# Patient Record
Sex: FEMALE | URBAN - METROPOLITAN AREA
[De-identification: names, ages, dates, MRNs, and addresses within clinical notes are randomized per-mention and may not be internally consistent; named-entity substitution may affect disease eponyms.]

---

## 2024-08-12 ENCOUNTER — HOSPITAL ENCOUNTER (EMERGENCY)
Facility: HOSPITAL | Age: 24
Discharge: HOME | End: 2024-08-12
Attending: EMERGENCY MEDICINE

## 2024-08-12 VITALS
TEMPERATURE: 99 F | OXYGEN SATURATION: 100 % | WEIGHT: 123 LBS | HEART RATE: 64 BPM | DIASTOLIC BLOOD PRESSURE: 60 MMHG | SYSTOLIC BLOOD PRESSURE: 120 MMHG | BODY MASS INDEX: 24.15 KG/M2 | HEIGHT: 60 IN | RESPIRATION RATE: 16 BRPM

## 2024-08-12 DIAGNOSIS — O46.90 VAGINAL BLEEDING IN PREGNANCY: Primary | ICD-10-CM

## 2024-08-12 LAB
ABO + RH BLD: NORMAL
ALBUMIN SERPL BCP-MCNC: 4 G/DL (ref 3.5–5.2)
ALP SERPL-CCNC: 50 U/L (ref 55–135)
ALT SERPL W/O P-5'-P-CCNC: 19 U/L (ref 10–44)
ANION GAP SERPL CALC-SCNC: 9 MMOL/L (ref 8–16)
AST SERPL-CCNC: 19 U/L (ref 10–40)
B-HCG UR QL: POSITIVE
BASOPHILS # BLD AUTO: 0.06 K/UL (ref 0–0.2)
BASOPHILS NFR BLD: 0.6 % (ref 0–1.9)
BILIRUB SERPL-MCNC: 0.7 MG/DL (ref 0.1–1)
BILIRUB UR QL STRIP: NEGATIVE
BUN SERPL-MCNC: 6 MG/DL (ref 6–20)
CALCIUM SERPL-MCNC: 9.2 MG/DL (ref 8.7–10.5)
CHLORIDE SERPL-SCNC: 106 MMOL/L (ref 95–110)
CLARITY UR: CLEAR
CO2 SERPL-SCNC: 22 MMOL/L (ref 23–29)
COLOR UR: COLORLESS
CREAT SERPL-MCNC: 0.6 MG/DL (ref 0.5–1.4)
CTP QC/QA: YES
DIFFERENTIAL METHOD BLD: ABNORMAL
EOSINOPHIL # BLD AUTO: 1.2 K/UL (ref 0–0.5)
EOSINOPHIL NFR BLD: 11.4 % (ref 0–8)
ERYTHROCYTE [DISTWIDTH] IN BLOOD BY AUTOMATED COUNT: 12.6 % (ref 11.5–14.5)
EST. GFR  (NO RACE VARIABLE): >60 ML/MIN/1.73 M^2
GLUCOSE SERPL-MCNC: 79 MG/DL (ref 70–110)
GLUCOSE UR QL STRIP: NEGATIVE
HCG INTACT+B SERPL-ACNC: NORMAL MIU/ML
HCT VFR BLD AUTO: 35.1 % (ref 37–48.5)
HGB BLD-MCNC: 11.8 G/DL (ref 12–16)
HGB UR QL STRIP: ABNORMAL
IMM GRANULOCYTES # BLD AUTO: 0.06 K/UL (ref 0–0.04)
IMM GRANULOCYTES NFR BLD AUTO: 0.6 % (ref 0–0.5)
KETONES UR QL STRIP: NEGATIVE
LEUKOCYTE ESTERASE UR QL STRIP: ABNORMAL
LYMPHOCYTES # BLD AUTO: 2.1 K/UL (ref 1–4.8)
LYMPHOCYTES NFR BLD: 19.9 % (ref 18–48)
MCH RBC QN AUTO: 28.8 PG (ref 27–31)
MCHC RBC AUTO-ENTMCNC: 33.6 G/DL (ref 32–36)
MCV RBC AUTO: 86 FL (ref 82–98)
MICROSCOPIC COMMENT: NORMAL
MONOCYTES # BLD AUTO: 0.7 K/UL (ref 0.3–1)
MONOCYTES NFR BLD: 6.5 % (ref 4–15)
NEUTROPHILS # BLD AUTO: 6.3 K/UL (ref 1.8–7.7)
NEUTROPHILS NFR BLD: 61 % (ref 38–73)
NITRITE UR QL STRIP: NEGATIVE
NRBC BLD-RTO: 0 /100 WBC
PH UR STRIP: 7 [PH] (ref 5–8)
PLATELET # BLD AUTO: 199 K/UL (ref 150–450)
PMV BLD AUTO: 10.2 FL (ref 9.2–12.9)
POTASSIUM SERPL-SCNC: 3.2 MMOL/L (ref 3.5–5.1)
PROT SERPL-MCNC: 7.5 G/DL (ref 6–8.4)
PROT UR QL STRIP: NEGATIVE
RBC # BLD AUTO: 4.1 M/UL (ref 4–5.4)
RBC #/AREA URNS HPF: 0 /HPF (ref 0–4)
SODIUM SERPL-SCNC: 137 MMOL/L (ref 136–145)
SP GR UR STRIP: 1.01 (ref 1–1.03)
SQUAMOUS #/AREA URNS HPF: 4 /HPF
URN SPEC COLLECT METH UR: ABNORMAL
UROBILINOGEN UR STRIP-ACNC: NEGATIVE EU/DL
WBC # BLD AUTO: 10.32 K/UL (ref 3.9–12.7)
WBC #/AREA URNS HPF: 0 /HPF (ref 0–5)

## 2024-08-12 PROCEDURE — 86901 BLOOD TYPING SEROLOGIC RH(D): CPT

## 2024-08-12 PROCEDURE — 84702 CHORIONIC GONADOTROPIN TEST: CPT

## 2024-08-12 PROCEDURE — 99284 EMERGENCY DEPT VISIT MOD MDM: CPT | Mod: 25

## 2024-08-12 PROCEDURE — 81025 URINE PREGNANCY TEST: CPT | Performed by: EMERGENCY MEDICINE

## 2024-08-12 PROCEDURE — 25000003 PHARM REV CODE 250

## 2024-08-12 PROCEDURE — 85025 COMPLETE CBC W/AUTO DIFF WBC: CPT

## 2024-08-12 PROCEDURE — 86900 BLOOD TYPING SEROLOGIC ABO: CPT

## 2024-08-12 PROCEDURE — 80053 COMPREHEN METABOLIC PANEL: CPT

## 2024-08-12 PROCEDURE — 81000 URINALYSIS NONAUTO W/SCOPE: CPT

## 2024-08-12 PROCEDURE — 96360 HYDRATION IV INFUSION INIT: CPT

## 2024-08-12 RX ADMIN — SODIUM CHLORIDE 1000 ML: 9 INJECTION, SOLUTION INTRAVENOUS at 11:08

## 2024-08-12 NOTE — Clinical Note
"Radha "Radha" Wiley Nicole was seen and treated in our emergency department on 8/12/2024.  She may return to work on 08/15/2024.       If you have any questions or concerns, please don't hesitate to call.      Jaylon Kinsey MD"

## 2024-08-12 NOTE — DISCHARGE INSTRUCTIONS
No sex.   No strenuous activity or exercise.   Encourage fluids/ water.     You are 10 wks and 6 days.     Estimate date of delivery is March 4th 2025.

## 2024-08-12 NOTE — ED TRIAGE NOTES
Vaginal Bleeding (Pt 3 months pregnant and c/o vaginal bleeding starting yesterday. Reports abd pain )

## 2024-08-12 NOTE — ED PROVIDER NOTES
"Encounter Date: 2024    SCRIBE #1 NOTE: I, Alexa Allen, am scribing for, and in the presence of,  Jaylon Kinsey MD. I have scribed the following portions of the note - Other sections scribed: HPI, ROS,PE,MDM.       History     Chief Complaint   Patient presents with    Vaginal Bleeding     Pt 3 months pregnant and c/o vaginal bleeding starting yesterday. Reports abd pain      Radhabogdan Nicole is a 24 y.o. female, A0, with no prior PMHx, who presents to the ED with complaint of intermittent vaginal "spotting" with associated intermittent abdominal pain and intermittent back pain that began yesterday. Patient rates pain a 4/10 and describes the pain being near her waist. Patient states she took a pregnancy test 3 months ago and it was positive. No other exacerbating or alleviating factors. Patient states last menstrual period was May 1st. Patient notes her 1st OBGYN appointment is 24. Patient is unsure who her physician is. Denies chest pain, dyspnea or other associated symptoms. NKDA. Patient denies EtOH, tobacco, or illicit drug use.   This encounter was done using a telephonic . The patient was given the opportunity to ask questions and everything was answered to her satisfaction. She voiced understanding of diagnosis and plan.  [place] Islander,  ID : [id number]      The history is provided by the patient. The history is limited by a language barrier. A  was used.     Review of patient's allergies indicates:  No Known Allergies  History reviewed. No pertinent past medical history.  History reviewed. No pertinent surgical history.  No family history on file.     Review of Systems   Constitutional:  Negative for fever.   HENT:  Negative for sore throat.    Eyes:  Negative for visual disturbance.   Respiratory:  Negative for shortness of breath.    Cardiovascular:  Negative for chest pain.   Gastrointestinal:  Positive for abdominal pain (intermittent). "   Genitourinary:  Positive for vaginal bleeding (intermittent spotting). Negative for dysuria.   Musculoskeletal:  Positive for back pain (intermittent).   Skin:  Negative for rash.       Physical Exam     Initial Vitals [08/12/24 0937]   BP Pulse Resp Temp SpO2   123/60 66 16 98.7 °F (37.1 °C) 100 %      MAP       --         Physical Exam    Nursing note and vitals reviewed.  Constitutional: She appears well-developed and well-nourished.   HENT:   Head: Normocephalic and atraumatic.   Eyes: EOM are normal. Pupils are equal, round, and reactive to light.   Neck: Neck supple. No thyromegaly present. No JVD present.   Normal range of motion.  Cardiovascular:  Normal rate and regular rhythm.     Exam reveals no gallop and no friction rub.       No murmur heard.  Pulmonary/Chest: Breath sounds normal. No respiratory distress.   Abdominal: Abdomen is soft. Bowel sounds are normal. There is abdominal tenderness (mild) in the suprapubic area. There is no rebound and no guarding.   Musculoskeletal:         General: No tenderness or edema. Normal range of motion.      Cervical back: Normal range of motion and neck supple.     Neurological: She is alert and oriented to person, place, and time. She has normal strength. GCS score is 15. GCS eye subscore is 4. GCS verbal subscore is 5. GCS motor subscore is 6.   Skin: Skin is warm and dry. Capillary refill takes less than 2 seconds.   Psychiatric: She has a normal mood and affect.         ED Course   Procedures  Labs Reviewed   CBC W/ AUTO DIFFERENTIAL - Abnormal       Result Value    WBC 10.32      RBC 4.10      Hemoglobin 11.8 (*)     Hematocrit 35.1 (*)     MCV 86      MCH 28.8      MCHC 33.6      RDW 12.6      Platelets 199      MPV 10.2      Immature Granulocytes 0.6 (*)     Gran # (ANC) 6.3      Immature Grans (Abs) 0.06 (*)     Lymph # 2.1      Mono # 0.7      Eos # 1.2 (*)     Baso # 0.06      nRBC 0      Gran % 61.0      Lymph % 19.9      Mono % 6.5      Eosinophil %  11.4 (*)     Basophil % 0.6      Differential Method Automated      Narrative:     Release to patient->Immediate   COMPREHENSIVE METABOLIC PANEL - Abnormal    Sodium 137      Potassium 3.2 (*)     Chloride 106      CO2 22 (*)     Glucose 79      BUN 6      Creatinine 0.6      Calcium 9.2      Total Protein 7.5      Albumin 4.0      Total Bilirubin 0.7      Alkaline Phosphatase 50 (*)     AST 19      ALT 19      eGFR >60      Anion Gap 9      Narrative:     Release to patient->Immediate   URINALYSIS, REFLEX TO URINE CULTURE - Abnormal    Specimen UA Urine, Clean Catch      Color, UA Colorless (*)     Appearance, UA Clear      pH, UA 7.0      Specific Gravity, UA 1.010      Protein, UA Negative      Glucose, UA Negative      Ketones, UA Negative      Bilirubin (UA) Negative      Occult Blood UA Trace (*)     Nitrite, UA Negative      Urobilinogen, UA Negative      Leukocytes, UA 2+ (*)     Narrative:     Specimen Source->Urine   POCT URINE PREGNANCY - Abnormal    POC Preg Test, Ur Positive (*)      Acceptable Yes     HCG, QUANTITATIVE    HCG Quant [id number]      Narrative:     Release to patient->Immediate   URINALYSIS MICROSCOPIC    RBC, UA 0      WBC, UA 0      Squam Epithel, UA 4      Microscopic Comment SEE COMMENT      Narrative:     Specimen Source->Urine   GROUP & RH    Group & Rh A POS            Imaging Results              US OB <14 Wks, TransAbd, Single Gestation (Final result)  Result time 08/12/24 12:06:26   Procedure changed from US OB <14 Wks TransAbd & TransVag, Single Gestation (XPD)     Final result by Adrian Collins MD (08/12/24 12:06:26)                   Impression:      Single live intrauterine pregnancy with estimated gestational age 10 weeks 6 days. and an CHANNING of 03/04/2025.    Borderline fetal tachycardia at 170 beats per minute.      Electronically signed by: Adrian Collins MD  Date:    08/12/2024  Time:    12:06               Narrative:    EXAMINATION:  US OB <14 WEEKS  "TRANSABDOM, SINGLE GESTATION    CLINICAL HISTORY:  Vag Bleeding;    TECHNIQUE:  Transabdominal only sonography of the pelvis was performed.    COMPARISON:  None.    FINDINGS:  Uterus: 12.6 x 7.6 x 8.1 cm.  No discrete fibroid.    Intrauterine gestation(s): Single    Mean gestational sac diameter: 3.8 cm    Yolk sac: None seen    Crown-rump length (CRL): 4 cm    Average fetal cardiac activity: 170 bpm, upper limits of normal    Subchorionic hemorrhage: None.    Right ovary: 3.6 x 2.6 x 2.6 cm with intact arterial and venous flow containing a 2 cm probable corpus luteum    Left ovary: 2.5 x 1.5 x 2 cm with intact arterial and venous flow    Miscellaneous: No Free Fluid.                                       Medications   sodium chloride 0.9% bolus 1,000 mL 1,000 mL (1,000 mLs Intravenous New Bag 24 1130)     Medical Decision Making  This is an emergent evaluation of a 24 y.o. female who presents A0, with no prior PMHx, who presents to the ED with complaint of intermittent vaginal "spotting" with associated intermittent abdominal pain and intermittent back pain that began yesterday.. The patient was seen and examined. The history and physical exam was obtained. The nursing notes and vital signs were reviewed. Secondary to symptoms and examination findings, I ordered labs. Will treat .       Amount and/or Complexity of Data Reviewed  Labs: ordered. Decision-making details documented in ED Course.     Details: See HPI    Analysis shows no evidence of infection of the 2+ leukocyte esterase but no bacteria no white blood cells.  Will not treat this time.  Due to being pregnant will send culture.  Patient has a viable intrauterine pregnancy at 10 weeks and 6 days.  Estimated due date of 2025.  No subchorionic hemorrhage or other abnormality noted.  Due to reported vaginal spotting recommend pelvic rest, limit strenuous exercise, encourage fluids, patient has follow up with Dr. Valverde on initial visit in 1 " week.        Scribe Attestation:   Scribe #1: I performed the above scribed service and the documentation accurately describes the services I performed. I attest to the accuracy of the note.                             I, Jaylon Kinsey, personally performed the services described in this documentation. All medical record entries made by the scribe were at my direction and in my presence. I have reviewed the chart and agree that the record reflects my personal performance and is accurate and complete.      DISCLAIMER: This note was prepared with IP Ghoster voice recognition transcription software. Garbled syntax, mangled pronouns, and other bizarre constructions may be attributed to that software system.    Clinical Impression:  Final diagnoses:  [O46.90] Vaginal bleeding in pregnancy (Primary)          ED Disposition Condition    Discharge Stable          ED Prescriptions    None       Follow-up Information       Follow up With Specialties Details Why Contact Info    Michael Valverde MD Obstetrics and Gynecology, Obstetrics and Gynecology  follow up with your scheduled appointment next week. [address]  [address]  [facility] [address]  [phone]      [facility] [place] - Emergency Dept Emergency Medicine  As needed, If symptoms worsen or new symptoms develop [address] [facility] [place] [id number] [phone]             Jaylon Kinsey MD  08/12/24 2843

## 2024-09-13 ENCOUNTER — INITIAL PRENATAL (OUTPATIENT)
Dept: OBSTETRICS AND GYNECOLOGY | Facility: CLINIC | Age: 24
End: 2024-09-13
Payer: MEDICAID

## 2024-09-13 ENCOUNTER — LAB VISIT (OUTPATIENT)
Dept: LAB | Facility: HOSPITAL | Age: 24
End: 2024-09-13
Attending: OBSTETRICS & GYNECOLOGY
Payer: MEDICAID

## 2024-09-13 VITALS
DIASTOLIC BLOOD PRESSURE: 60 MMHG | BODY MASS INDEX: 24.95 KG/M2 | SYSTOLIC BLOOD PRESSURE: 114 MMHG | WEIGHT: 127.75 LBS

## 2024-09-13 DIAGNOSIS — Z3A.15 PREGNANCY WITH 15 COMPLETED WEEKS GESTATION: ICD-10-CM

## 2024-09-13 DIAGNOSIS — Z34.02 ENCOUNTER FOR SUPERVISION OF NORMAL PRIMIGRAVIDA IN SECOND TRIMESTER, ANTEPARTUM: Primary | ICD-10-CM

## 2024-09-13 DIAGNOSIS — Z34.02 ENCOUNTER FOR SUPERVISION OF NORMAL PRIMIGRAVIDA IN SECOND TRIMESTER, ANTEPARTUM: ICD-10-CM

## 2024-09-13 LAB
ABO + RH BLD: NORMAL
BASOPHILS # BLD AUTO: 0.07 K/UL (ref 0–0.2)
BASOPHILS NFR BLD: 0.7 % (ref 0–1.9)
BLD GP AB SCN CELLS X3 SERPL QL: NORMAL
DIFFERENTIAL METHOD BLD: ABNORMAL
EOSINOPHIL # BLD AUTO: 0.9 K/UL (ref 0–0.5)
EOSINOPHIL NFR BLD: 8.8 % (ref 0–8)
ERYTHROCYTE [DISTWIDTH] IN BLOOD BY AUTOMATED COUNT: 12.9 % (ref 11.5–14.5)
ESTIMATED AVG GLUCOSE: 88 MG/DL (ref 68–131)
HBA1C MFR BLD: 4.7 % (ref 4–5.6)
HBV SURFACE AG SERPL QL IA: NORMAL
HCT VFR BLD AUTO: 36.9 % (ref 37–48.5)
HCV AB SERPL QL IA: NORMAL
HGB BLD-MCNC: 12 G/DL (ref 12–16)
HGB S BLD QL SOLY: NEGATIVE
HIV 1+2 AB+HIV1 P24 AG SERPL QL IA: NORMAL
IMM GRANULOCYTES # BLD AUTO: 0.09 K/UL (ref 0–0.04)
IMM GRANULOCYTES NFR BLD AUTO: 0.9 % (ref 0–0.5)
LYMPHOCYTES # BLD AUTO: 2 K/UL (ref 1–4.8)
LYMPHOCYTES NFR BLD: 19.2 % (ref 18–48)
MCH RBC QN AUTO: 28.6 PG (ref 27–31)
MCHC RBC AUTO-ENTMCNC: 32.5 G/DL (ref 32–36)
MCV RBC AUTO: 88 FL (ref 82–98)
MONOCYTES # BLD AUTO: 0.6 K/UL (ref 0.3–1)
MONOCYTES NFR BLD: 5.2 % (ref 4–15)
NEUTROPHILS # BLD AUTO: 6.9 K/UL (ref 1.8–7.7)
NEUTROPHILS NFR BLD: 65.2 % (ref 38–73)
NRBC BLD-RTO: 0 /100 WBC
PLATELET # BLD AUTO: 215 K/UL (ref 150–450)
PMV BLD AUTO: 10.3 FL (ref 9.2–12.9)
RBC # BLD AUTO: 4.19 M/UL (ref 4–5.4)
SPECIMEN OUTDATE: NORMAL
TREPONEMA PALLIDUM IGG+IGM AB [PRESENCE] IN SERUM OR PLASMA BY IMMUNOASSAY: NONREACTIVE
WBC # BLD AUTO: 10.49 K/UL (ref 3.9–12.7)

## 2024-09-13 PROCEDURE — 86803 HEPATITIS C AB TEST: CPT | Performed by: OBSTETRICS & GYNECOLOGY

## 2024-09-13 PROCEDURE — 99999 PR PBB SHADOW E&M-EST. PATIENT-LVL II: CPT | Mod: PBBFAC,,, | Performed by: OBSTETRICS & GYNECOLOGY

## 2024-09-13 PROCEDURE — 86762 RUBELLA ANTIBODY: CPT | Performed by: OBSTETRICS & GYNECOLOGY

## 2024-09-13 PROCEDURE — 83036 HEMOGLOBIN GLYCOSYLATED A1C: CPT | Performed by: OBSTETRICS & GYNECOLOGY

## 2024-09-13 PROCEDURE — 87491 CHLMYD TRACH DNA AMP PROBE: CPT | Performed by: OBSTETRICS & GYNECOLOGY

## 2024-09-13 PROCEDURE — 85660 RBC SICKLE CELL TEST: CPT | Performed by: OBSTETRICS & GYNECOLOGY

## 2024-09-13 PROCEDURE — 85025 COMPLETE CBC W/AUTO DIFF WBC: CPT | Performed by: OBSTETRICS & GYNECOLOGY

## 2024-09-13 PROCEDURE — 99212 OFFICE O/P EST SF 10 MIN: CPT | Mod: PBBFAC,TH | Performed by: OBSTETRICS & GYNECOLOGY

## 2024-09-13 PROCEDURE — 87086 URINE CULTURE/COLONY COUNT: CPT | Performed by: OBSTETRICS & GYNECOLOGY

## 2024-09-13 PROCEDURE — 86900 BLOOD TYPING SEROLOGIC ABO: CPT | Performed by: OBSTETRICS & GYNECOLOGY

## 2024-09-13 PROCEDURE — 86850 RBC ANTIBODY SCREEN: CPT | Performed by: OBSTETRICS & GYNECOLOGY

## 2024-09-13 PROCEDURE — 81511 FTL CGEN ABNOR FOUR ANAL: CPT | Performed by: OBSTETRICS & GYNECOLOGY

## 2024-09-13 PROCEDURE — 87389 HIV-1 AG W/HIV-1&-2 AB AG IA: CPT | Performed by: OBSTETRICS & GYNECOLOGY

## 2024-09-13 PROCEDURE — 86593 SYPHILIS TEST NON-TREP QUANT: CPT | Performed by: OBSTETRICS & GYNECOLOGY

## 2024-09-13 PROCEDURE — 87340 HEPATITIS B SURFACE AG IA: CPT | Performed by: OBSTETRICS & GYNECOLOGY

## 2024-09-13 RX ORDER — VITAMIN A, ASCORBIC ACID, VITAMIN D, .ALPHA.-TOCOPHEROL, THIAMINE MONONITRATE, RIBOFLAVIN, NIACIN, PYRIDOXINE HYDROCHLORIDE, FOLIC ACID, CYANOCOBALAMIN, CALCIUM, IRON, MAGNESIUM, ZINC, COPPER, AND DOCONEXENT 65-1-250MG
1 KIT ORAL DAILY
Qty: 30 EACH | Refills: 11 | Status: SHIPPED | OUTPATIENT
Start: 2024-09-13 | End: 2025-09-13

## 2024-09-13 NOTE — PROGRESS NOTES
Pt here for NOB.  Pt had sono in ER 1 month ago.  Pt was seen for bleeding which has since abated.  24 yr G1 @ 15 wk 3 d.  AMN interpretor used.  PN labs, PNV and anatomy scan ordered.    PMH and PSH neg.    Assessment/Plan  1. Encounter for supervision of normal primigravida in second trimester, antepartum    2. Pregnancy with 15 completed weeks gestation      45 minutes spend in total time reviewing labs, prior sonos and previous visits

## 2024-09-14 LAB — BACTERIA UR CULT: NORMAL

## 2024-09-16 ENCOUNTER — TELEPHONE (OUTPATIENT)
Dept: MATERNAL FETAL MEDICINE | Facility: CLINIC | Age: 24
End: 2024-09-16
Payer: MEDICAID

## 2024-09-16 LAB
# FETUSES US: NORMAL
2ND TRIMESTER 4 SCREEN PNL SERPL: NEGATIVE
2ND TRIMESTER 4 SCREEN SERPL-IMP: NORMAL
AFP MOM SERPL: 0.96
AFP SERPL-MCNC: 32 NG/ML
AGE AT DELIVERY: 23
B-HCG MOM SERPL: 0.64
B-HCG SERPL-ACNC: 32 IU/ML
FET TS 21 RISK FROM MAT AGE: NORMAL
GA (DAYS): 3 D
GA (WEEKS): 15 WK
GA METHOD: NORMAL
IDDM PATIENT QL: NORMAL
INHIBIN A MOM SERPL: 0.8
INHIBIN A SERPL-MCNC: 137.6 PG/ML
RUBV IGG SER-ACNC: 67.1 IU/ML
RUBV IGG SER-IMP: REACTIVE
SMOKING STATUS FTND: NO
TS 18 RISK FETUS: NORMAL
TS 21 RISK FETUS: NORMAL
U ESTRIOL MOM SERPL: 0.98
U ESTRIOL SERPL-MCNC: 1.01 NG/ML

## 2024-09-20 ENCOUNTER — TELEPHONE (OUTPATIENT)
Dept: MATERNAL FETAL MEDICINE | Facility: CLINIC | Age: 24
End: 2024-09-20
Payer: MEDICAID

## 2024-09-24 ENCOUNTER — TELEPHONE (OUTPATIENT)
Dept: MATERNAL FETAL MEDICINE | Facility: CLINIC | Age: 24
End: 2024-09-24
Payer: MEDICAID

## 2024-09-24 ENCOUNTER — PATIENT MESSAGE (OUTPATIENT)
Dept: OTHER | Facility: OTHER | Age: 24
End: 2024-09-24
Payer: MEDICAID

## 2024-10-07 ENCOUNTER — TELEPHONE (OUTPATIENT)
Dept: MATERNAL FETAL MEDICINE | Facility: CLINIC | Age: 24
End: 2024-10-07
Payer: MEDICAID

## 2024-10-07 ENCOUNTER — ROUTINE PRENATAL (OUTPATIENT)
Dept: OBSTETRICS AND GYNECOLOGY | Facility: CLINIC | Age: 24
End: 2024-10-07
Payer: MEDICAID

## 2024-10-07 VITALS
DIASTOLIC BLOOD PRESSURE: 74 MMHG | BODY MASS INDEX: 25.04 KG/M2 | SYSTOLIC BLOOD PRESSURE: 112 MMHG | WEIGHT: 128.19 LBS

## 2024-10-07 DIAGNOSIS — Z34.02 ENCOUNTER FOR SUPERVISION OF NORMAL PRIMIGRAVIDA IN SECOND TRIMESTER, ANTEPARTUM: Primary | ICD-10-CM

## 2024-10-07 DIAGNOSIS — Z3A.18 18 WEEKS GESTATION OF PREGNANCY: ICD-10-CM

## 2024-10-07 PROCEDURE — 99212 OFFICE O/P EST SF 10 MIN: CPT | Mod: PBBFAC,TH | Performed by: OBSTETRICS & GYNECOLOGY

## 2024-10-07 PROCEDURE — 99999 PR PBB SHADOW E&M-EST. PATIENT-LVL II: CPT | Mod: PBBFAC,,, | Performed by: OBSTETRICS & GYNECOLOGY

## 2024-10-07 PROCEDURE — 99213 OFFICE O/P EST LOW 20 MIN: CPT | Mod: TH,S$PBB,, | Performed by: OBSTETRICS & GYNECOLOGY

## 2024-10-07 NOTE — PROGRESS NOTES
Pt doing well, denies any c/o; starting to feel FM.  Pt could not be contacted for anatomy scan; office notified that contact information is correct.  AMN interpretor used    Assessment/Plan  1. Encounter for supervision of normal primigravida in second trimester, antepartum    2. 18 weeks gestation of pregnancy      20 minutes spend in total time reviewing labs, prior sonos and previous visits

## 2024-10-07 NOTE — TELEPHONE ENCOUNTER
Attempted to contact patient as office confirmed her number. It stated this patient does not have a voicemail set up.

## 2024-10-14 ENCOUNTER — PROCEDURE VISIT (OUTPATIENT)
Dept: MATERNAL FETAL MEDICINE | Facility: CLINIC | Age: 24
End: 2024-10-14
Payer: MEDICAID

## 2024-10-14 DIAGNOSIS — Z34.02 ENCOUNTER FOR SUPERVISION OF NORMAL PRIMIGRAVIDA IN SECOND TRIMESTER, ANTEPARTUM: ICD-10-CM

## 2024-10-14 DIAGNOSIS — Z3A.15 PREGNANCY WITH 15 COMPLETED WEEKS GESTATION: ICD-10-CM

## 2024-10-14 DIAGNOSIS — Z36.2 ENCOUNTER FOR FOLLOW-UP ULTRASOUND OF FETAL ANATOMY: Primary | ICD-10-CM

## 2024-10-14 PROCEDURE — 76805 OB US >/= 14 WKS SNGL FETUS: CPT | Mod: PBBFAC | Performed by: OBSTETRICS & GYNECOLOGY

## 2024-10-15 ENCOUNTER — PATIENT MESSAGE (OUTPATIENT)
Dept: OTHER | Facility: OTHER | Age: 24
End: 2024-10-15
Payer: MEDICAID

## 2024-11-04 ENCOUNTER — ROUTINE PRENATAL (OUTPATIENT)
Dept: OBSTETRICS AND GYNECOLOGY | Facility: CLINIC | Age: 24
End: 2024-11-04
Payer: MEDICAID

## 2024-11-04 VITALS — DIASTOLIC BLOOD PRESSURE: 74 MMHG | SYSTOLIC BLOOD PRESSURE: 114 MMHG | BODY MASS INDEX: 26.26 KG/M2 | WEIGHT: 134.5 LBS

## 2024-11-04 DIAGNOSIS — Z34.02 ENCOUNTER FOR SUPERVISION OF NORMAL PRIMIGRAVIDA IN SECOND TRIMESTER, ANTEPARTUM: Primary | ICD-10-CM

## 2024-11-04 DIAGNOSIS — Z3A.22 22 WEEKS GESTATION OF PREGNANCY: ICD-10-CM

## 2024-11-04 PROCEDURE — 99213 OFFICE O/P EST LOW 20 MIN: CPT | Mod: TH,S$PBB,, | Performed by: OBSTETRICS & GYNECOLOGY

## 2024-11-04 PROCEDURE — 99999 PR PBB SHADOW E&M-EST. PATIENT-LVL II: CPT | Mod: PBBFAC,,, | Performed by: OBSTETRICS & GYNECOLOGY

## 2024-11-04 PROCEDURE — 99212 OFFICE O/P EST SF 10 MIN: CPT | Mod: PBBFAC,TH | Performed by: OBSTETRICS & GYNECOLOGY

## 2024-11-04 NOTE — PROGRESS NOTES
Pt doing well, denies any c/o; AMN interpretor used.  Discussed PN II labs next visit.    Assessment/Plan  1. Encounter for supervision of normal primigravida in second trimester, antepartum    2. 22 weeks gestation of pregnancy      20 minutes spend in total time reviewing labs, prior sonos and previous visits

## 2024-11-11 ENCOUNTER — PROCEDURE VISIT (OUTPATIENT)
Dept: MATERNAL FETAL MEDICINE | Facility: CLINIC | Age: 24
End: 2024-11-11
Payer: MEDICAID

## 2024-11-11 DIAGNOSIS — Z36.2 ENCOUNTER FOR FOLLOW-UP ULTRASOUND OF FETAL ANATOMY: ICD-10-CM

## 2024-11-11 PROCEDURE — 76816 OB US FOLLOW-UP PER FETUS: CPT | Mod: PBBFAC | Performed by: OBSTETRICS & GYNECOLOGY

## 2024-11-12 ENCOUNTER — PATIENT MESSAGE (OUTPATIENT)
Dept: OTHER | Facility: OTHER | Age: 24
End: 2024-11-12
Payer: MEDICAID

## 2024-11-26 ENCOUNTER — PATIENT MESSAGE (OUTPATIENT)
Dept: OTHER | Facility: OTHER | Age: 24
End: 2024-11-26
Payer: MEDICAID

## 2024-12-02 ENCOUNTER — ROUTINE PRENATAL (OUTPATIENT)
Dept: OBSTETRICS AND GYNECOLOGY | Facility: CLINIC | Age: 24
End: 2024-12-02
Payer: MEDICAID

## 2024-12-02 VITALS — BODY MASS INDEX: 27.54 KG/M2 | WEIGHT: 141 LBS | SYSTOLIC BLOOD PRESSURE: 108 MMHG | DIASTOLIC BLOOD PRESSURE: 60 MMHG

## 2024-12-02 DIAGNOSIS — Z3A.26 26 WEEKS GESTATION OF PREGNANCY: ICD-10-CM

## 2024-12-02 DIAGNOSIS — Z34.02 ENCOUNTER FOR SUPERVISION OF NORMAL PRIMIGRAVIDA IN SECOND TRIMESTER, ANTEPARTUM: Primary | ICD-10-CM

## 2024-12-02 PROCEDURE — 99213 OFFICE O/P EST LOW 20 MIN: CPT | Mod: TH,S$PBB,, | Performed by: OBSTETRICS & GYNECOLOGY

## 2024-12-02 NOTE — PROGRESS NOTES
Pt doing well, denies any c/o; discussed need for PN II labs which pt will do this week.    Assessment/Plan  1. Encounter for supervision of normal primigravida in second trimester, antepartum    2. 26 weeks gestation of pregnancy      20 minutes spend in total time reviewing labs, prior sonos and previous visits

## 2024-12-03 ENCOUNTER — LAB VISIT (OUTPATIENT)
Dept: LAB | Facility: HOSPITAL | Age: 24
End: 2024-12-03
Attending: OBSTETRICS & GYNECOLOGY
Payer: MEDICAID

## 2024-12-03 DIAGNOSIS — Z3A.26 26 WEEKS GESTATION OF PREGNANCY: ICD-10-CM

## 2024-12-03 DIAGNOSIS — Z34.02 ENCOUNTER FOR SUPERVISION OF NORMAL PRIMIGRAVIDA IN SECOND TRIMESTER, ANTEPARTUM: ICD-10-CM

## 2024-12-03 LAB
ABO + RH BLD: NORMAL
BASOPHILS # BLD AUTO: 0.04 K/UL (ref 0–0.2)
BASOPHILS NFR BLD: 0.5 % (ref 0–1.9)
BLD GP AB SCN CELLS X3 SERPL QL: NORMAL
DIFFERENTIAL METHOD BLD: ABNORMAL
EOSINOPHIL # BLD AUTO: 0.5 K/UL (ref 0–0.5)
EOSINOPHIL NFR BLD: 5.5 % (ref 0–8)
ERYTHROCYTE [DISTWIDTH] IN BLOOD BY AUTOMATED COUNT: 12.9 % (ref 11.5–14.5)
GLUCOSE SERPL-MCNC: 116 MG/DL (ref 70–140)
HCT VFR BLD AUTO: 34.7 % (ref 37–48.5)
HGB BLD-MCNC: 11.8 G/DL (ref 12–16)
IMM GRANULOCYTES # BLD AUTO: 0.09 K/UL (ref 0–0.04)
IMM GRANULOCYTES NFR BLD AUTO: 1 % (ref 0–0.5)
LYMPHOCYTES # BLD AUTO: 1.5 K/UL (ref 1–4.8)
LYMPHOCYTES NFR BLD: 16.9 % (ref 18–48)
MCH RBC QN AUTO: 30.8 PG (ref 27–31)
MCHC RBC AUTO-ENTMCNC: 34 G/DL (ref 32–36)
MCV RBC AUTO: 91 FL (ref 82–98)
MONOCYTES # BLD AUTO: 0.6 K/UL (ref 0.3–1)
MONOCYTES NFR BLD: 6.6 % (ref 4–15)
NEUTROPHILS # BLD AUTO: 6.1 K/UL (ref 1.8–7.7)
NEUTROPHILS NFR BLD: 69.5 % (ref 38–73)
NRBC BLD-RTO: 0 /100 WBC
PLATELET # BLD AUTO: 171 K/UL (ref 150–450)
PMV BLD AUTO: 11 FL (ref 9.2–12.9)
RBC # BLD AUTO: 3.83 M/UL (ref 4–5.4)
SPECIMEN OUTDATE: NORMAL
WBC # BLD AUTO: 8.74 K/UL (ref 3.9–12.7)

## 2024-12-03 PROCEDURE — 85025 COMPLETE CBC W/AUTO DIFF WBC: CPT | Performed by: OBSTETRICS & GYNECOLOGY

## 2024-12-03 PROCEDURE — 36415 COLL VENOUS BLD VENIPUNCTURE: CPT | Performed by: OBSTETRICS & GYNECOLOGY

## 2024-12-03 PROCEDURE — 86850 RBC ANTIBODY SCREEN: CPT | Performed by: OBSTETRICS & GYNECOLOGY

## 2024-12-03 PROCEDURE — 82950 GLUCOSE TEST: CPT | Performed by: OBSTETRICS & GYNECOLOGY

## 2024-12-10 ENCOUNTER — PATIENT MESSAGE (OUTPATIENT)
Dept: OTHER | Facility: OTHER | Age: 24
End: 2024-12-10
Payer: MEDICAID

## 2024-12-24 ENCOUNTER — PATIENT MESSAGE (OUTPATIENT)
Dept: OTHER | Facility: OTHER | Age: 24
End: 2024-12-24
Payer: MEDICAID

## 2024-12-30 ENCOUNTER — ROUTINE PRENATAL (OUTPATIENT)
Dept: OBSTETRICS AND GYNECOLOGY | Facility: CLINIC | Age: 24
End: 2024-12-30
Payer: MEDICAID

## 2024-12-30 VITALS
SYSTOLIC BLOOD PRESSURE: 110 MMHG | BODY MASS INDEX: 28.52 KG/M2 | WEIGHT: 146.06 LBS | DIASTOLIC BLOOD PRESSURE: 80 MMHG

## 2024-12-30 DIAGNOSIS — Z3A.30 PREGNANCY WITH 30 COMPLETED WEEKS GESTATION: ICD-10-CM

## 2024-12-30 DIAGNOSIS — Z34.83 NORMAL PREGNANCY IN MULTIGRAVIDA IN THIRD TRIMESTER: Primary | ICD-10-CM

## 2024-12-30 PROCEDURE — 99213 OFFICE O/P EST LOW 20 MIN: CPT | Mod: TH,S$PBB,, | Performed by: OBSTETRICS & GYNECOLOGY

## 2024-12-30 PROCEDURE — 99999 PR PBB SHADOW E&M-EST. PATIENT-LVL II: CPT | Mod: PBBFAC,,, | Performed by: OBSTETRICS & GYNECOLOGY

## 2024-12-30 PROCEDURE — 99212 OFFICE O/P EST SF 10 MIN: CPT | Mod: PBBFAC,TH | Performed by: OBSTETRICS & GYNECOLOGY

## 2024-12-30 NOTE — PROGRESS NOTES
Pt doing well, denies any c/o today except mild pelvic discomfort.  Good FM, no LOF, no VB.  Discussed normal DM scr.    Assessment/Plan  1. Normal pregnancy in multigravida in third trimester    2. Pregnancy with 30 completed weeks gestation      20 minutes spend in total time reviewing labs, prior sonos and previous visits

## 2025-01-07 ENCOUNTER — PATIENT MESSAGE (OUTPATIENT)
Dept: OTHER | Facility: OTHER | Age: 25
End: 2025-01-07
Payer: MEDICAID

## 2025-01-13 ENCOUNTER — ROUTINE PRENATAL (OUTPATIENT)
Dept: OBSTETRICS AND GYNECOLOGY | Facility: CLINIC | Age: 25
End: 2025-01-13
Payer: MEDICAID

## 2025-01-13 VITALS
SYSTOLIC BLOOD PRESSURE: 114 MMHG | WEIGHT: 147.69 LBS | DIASTOLIC BLOOD PRESSURE: 80 MMHG | BODY MASS INDEX: 28.85 KG/M2

## 2025-01-13 DIAGNOSIS — Z34.03 ENCOUNTER FOR SUPERVISION OF NORMAL PRIMIGRAVIDA IN THIRD TRIMESTER, ANTEPARTUM: Primary | ICD-10-CM

## 2025-01-13 DIAGNOSIS — Z3A.32 32 WEEKS GESTATION OF PREGNANCY: ICD-10-CM

## 2025-01-13 PROCEDURE — 99213 OFFICE O/P EST LOW 20 MIN: CPT | Mod: TH,S$PBB,, | Performed by: OBSTETRICS & GYNECOLOGY

## 2025-01-13 PROCEDURE — 99999 PR PBB SHADOW E&M-EST. PATIENT-LVL II: CPT | Mod: PBBFAC,,, | Performed by: OBSTETRICS & GYNECOLOGY

## 2025-01-13 PROCEDURE — 99212 OFFICE O/P EST SF 10 MIN: CPT | Mod: PBBFAC,TH | Performed by: OBSTETRICS & GYNECOLOGY

## 2025-01-27 ENCOUNTER — ROUTINE PRENATAL (OUTPATIENT)
Dept: OBSTETRICS AND GYNECOLOGY | Facility: CLINIC | Age: 25
End: 2025-01-27
Payer: MEDICAID

## 2025-01-27 VITALS — SYSTOLIC BLOOD PRESSURE: 110 MMHG | DIASTOLIC BLOOD PRESSURE: 54 MMHG | BODY MASS INDEX: 30.4 KG/M2 | WEIGHT: 155.63 LBS

## 2025-01-27 DIAGNOSIS — Z34.03 ENCOUNTER FOR SUPERVISION OF NORMAL PRIMIGRAVIDA IN THIRD TRIMESTER, ANTEPARTUM: Primary | ICD-10-CM

## 2025-01-27 DIAGNOSIS — Z3A.34 34 WEEKS GESTATION OF PREGNANCY: ICD-10-CM

## 2025-01-27 PROCEDURE — 99999 PR PBB SHADOW E&M-EST. PATIENT-LVL II: CPT | Mod: PBBFAC,,, | Performed by: OBSTETRICS & GYNECOLOGY

## 2025-01-27 PROCEDURE — 99212 OFFICE O/P EST SF 10 MIN: CPT | Mod: PBBFAC,TH | Performed by: OBSTETRICS & GYNECOLOGY

## 2025-01-27 PROCEDURE — 99213 OFFICE O/P EST LOW 20 MIN: CPT | Mod: TH,S$PBB,, | Performed by: OBSTETRICS & GYNECOLOGY

## 2025-01-27 NOTE — PROGRESS NOTES
Pt doing well, denies any c/o; good FM, no bleeding, occasional ctx's    Assessment/Plan  1. Encounter for supervision of normal primigravida in third trimester, antepartum    2. 34 weeks gestation of pregnancy      F/u 2 wks

## 2025-01-28 ENCOUNTER — PATIENT MESSAGE (OUTPATIENT)
Dept: OTHER | Facility: OTHER | Age: 25
End: 2025-01-28
Payer: MEDICAID

## 2025-02-10 ENCOUNTER — ROUTINE PRENATAL (OUTPATIENT)
Dept: OBSTETRICS AND GYNECOLOGY | Facility: CLINIC | Age: 25
End: 2025-02-10
Payer: MEDICAID

## 2025-02-10 VITALS — BODY MASS INDEX: 30.57 KG/M2 | WEIGHT: 156.5 LBS | DIASTOLIC BLOOD PRESSURE: 64 MMHG | SYSTOLIC BLOOD PRESSURE: 112 MMHG

## 2025-02-10 DIAGNOSIS — Z3A.36 36 WEEKS GESTATION OF PREGNANCY: ICD-10-CM

## 2025-02-10 DIAGNOSIS — Z34.03 ENCOUNTER FOR SUPERVISION OF NORMAL PRIMIGRAVIDA IN THIRD TRIMESTER, ANTEPARTUM: Primary | ICD-10-CM

## 2025-02-10 PROCEDURE — 87653 STREP B DNA AMP PROBE: CPT | Performed by: OBSTETRICS & GYNECOLOGY

## 2025-02-10 PROCEDURE — 99212 OFFICE O/P EST SF 10 MIN: CPT | Mod: PBBFAC,TH | Performed by: OBSTETRICS & GYNECOLOGY

## 2025-02-10 PROCEDURE — 99999 PR PBB SHADOW E&M-EST. PATIENT-LVL II: CPT | Mod: PBBFAC,,, | Performed by: OBSTETRICS & GYNECOLOGY

## 2025-02-10 PROCEDURE — 99213 OFFICE O/P EST LOW 20 MIN: CPT | Mod: TH,S$PBB,, | Performed by: OBSTETRICS & GYNECOLOGY

## 2025-02-10 NOTE — PROGRESS NOTES
Pt c/o vaginal discharge, no odor or itching.  Pt admits some ctx's.  AMN interpretor used.  GBS collected.  Delivery consents given.    Assessment/Plan  1. Encounter for supervision of normal primigravida in third trimester, antepartum    2. 36 weeks gestation of pregnancy      20 minutes spend in total time reviewing labs, prior sonos and previous visits  Female chaperone, Nicolas Luis,  present for entire exam\

## 2025-02-11 ENCOUNTER — HOSPITAL ENCOUNTER (INPATIENT)
Facility: HOSPITAL | Age: 25
LOS: 2 days | Discharge: HOME | End: 2025-02-13
Attending: FAMILY MEDICINE | Admitting: OBSTETRICS & GYNECOLOGY
Payer: MEDICAID

## 2025-02-11 ENCOUNTER — ANESTHESIA EVENT (OUTPATIENT)
Dept: OBSTETRICS AND GYNECOLOGY | Facility: HOSPITAL | Age: 25
End: 2025-02-11
Payer: MEDICAID

## 2025-02-11 ENCOUNTER — ANESTHESIA (OUTPATIENT)
Dept: OBSTETRICS AND GYNECOLOGY | Facility: HOSPITAL | Age: 25
End: 2025-02-11
Payer: MEDICAID

## 2025-02-11 DIAGNOSIS — Z37.9 VACUUM-ASSISTED VAGINAL DELIVERY: Primary | ICD-10-CM

## 2025-02-11 DIAGNOSIS — Z34.90 PREGNANCY: ICD-10-CM

## 2025-02-11 PROBLEM — Z3A.37 PREGNANCY WITH 37 WEEKS COMPLETED GESTATION: Status: RESOLVED | Noted: 2025-02-11 | Resolved: 2025-02-11

## 2025-02-11 PROBLEM — R52 PAIN DURING LABOR: Status: RESOLVED | Noted: 2025-02-11 | Resolved: 2025-02-11

## 2025-02-11 PROBLEM — Z3A.37 PREGNANCY WITH 37 WEEKS COMPLETED GESTATION: Status: ACTIVE | Noted: 2025-02-11

## 2025-02-11 PROBLEM — R52 PAIN DURING LABOR: Status: ACTIVE | Noted: 2025-02-11

## 2025-02-11 LAB
ABO + RH BLD: NORMAL
ALBUMIN SERPL BCP-MCNC: 3.3 G/DL (ref 3.5–5.2)
ALP SERPL-CCNC: 353 U/L (ref 40–150)
ALT SERPL W/O P-5'-P-CCNC: 24 U/L (ref 10–44)
ANION GAP SERPL CALC-SCNC: 10 MMOL/L (ref 8–16)
AST SERPL-CCNC: 30 U/L (ref 10–40)
BASOPHILS # BLD AUTO: 0.06 K/UL (ref 0–0.2)
BASOPHILS NFR BLD: 0.4 % (ref 0–1.9)
BILIRUB SERPL-MCNC: 0.4 MG/DL (ref 0.1–1)
BLD GP AB SCN CELLS X3 SERPL QL: NORMAL
BUN SERPL-MCNC: 9 MG/DL (ref 6–20)
CALCIUM SERPL-MCNC: 9.2 MG/DL (ref 8.7–10.5)
CHLORIDE SERPL-SCNC: 106 MMOL/L (ref 95–110)
CO2 SERPL-SCNC: 22 MMOL/L (ref 23–29)
CREAT SERPL-MCNC: 0.6 MG/DL (ref 0.5–1.4)
DIFFERENTIAL METHOD BLD: ABNORMAL
EOSINOPHIL # BLD AUTO: 0.3 K/UL (ref 0–0.5)
EOSINOPHIL NFR BLD: 2.2 % (ref 0–8)
ERYTHROCYTE [DISTWIDTH] IN BLOOD BY AUTOMATED COUNT: 13.2 % (ref 11.5–14.5)
EST. GFR  (NO RACE VARIABLE): >60 ML/MIN/1.73 M^2
GLUCOSE SERPL-MCNC: 79 MG/DL (ref 70–110)
HCT VFR BLD AUTO: 40 % (ref 37–48.5)
HGB BLD-MCNC: 13.7 G/DL (ref 12–16)
IMM GRANULOCYTES # BLD AUTO: 0.1 K/UL (ref 0–0.04)
IMM GRANULOCYTES NFR BLD AUTO: 0.7 % (ref 0–0.5)
LYMPHOCYTES # BLD AUTO: 1.9 K/UL (ref 1–4.8)
LYMPHOCYTES NFR BLD: 13.7 % (ref 18–48)
MCH RBC QN AUTO: 29.5 PG (ref 27–31)
MCHC RBC AUTO-ENTMCNC: 34.3 G/DL (ref 32–36)
MCV RBC AUTO: 86 FL (ref 82–98)
MONOCYTES # BLD AUTO: 0.7 K/UL (ref 0.3–1)
MONOCYTES NFR BLD: 5.2 % (ref 4–15)
NEUTROPHILS # BLD AUTO: 10.7 K/UL (ref 1.8–7.7)
NEUTROPHILS NFR BLD: 77.8 % (ref 38–73)
NRBC BLD-RTO: 0 /100 WBC
PLATELET # BLD AUTO: 185 K/UL (ref 150–450)
PMV BLD AUTO: 12.7 FL (ref 9.2–12.9)
POTASSIUM SERPL-SCNC: 3.7 MMOL/L (ref 3.5–5.1)
PROT SERPL-MCNC: 7.9 G/DL (ref 6–8.4)
RBC # BLD AUTO: 4.64 M/UL (ref 4–5.4)
SODIUM SERPL-SCNC: 138 MMOL/L (ref 136–145)
WBC # BLD AUTO: 13.79 K/UL (ref 3.9–12.7)

## 2025-02-11 PROCEDURE — 25000003 PHARM REV CODE 250: Performed by: OBSTETRICS & GYNECOLOGY

## 2025-02-11 PROCEDURE — 62326 NJX INTERLAMINAR LMBR/SAC: CPT | Performed by: STUDENT IN AN ORGANIZED HEALTH CARE EDUCATION/TRAINING PROGRAM

## 2025-02-11 PROCEDURE — 11000001 HC ACUTE MED/SURG PRIVATE ROOM

## 2025-02-11 PROCEDURE — 59409 OBSTETRICAL CARE: CPT | Mod: AT,,, | Performed by: OBSTETRICS & GYNECOLOGY

## 2025-02-11 PROCEDURE — 51702 INSERT TEMP BLADDER CATH: CPT

## 2025-02-11 PROCEDURE — 25000003 PHARM REV CODE 250: Performed by: STUDENT IN AN ORGANIZED HEALTH CARE EDUCATION/TRAINING PROGRAM

## 2025-02-11 PROCEDURE — 87389 HIV-1 AG W/HIV-1&-2 AB AG IA: CPT | Performed by: OBSTETRICS & GYNECOLOGY

## 2025-02-11 PROCEDURE — 85025 COMPLETE CBC W/AUTO DIFF WBC: CPT | Performed by: OBSTETRICS & GYNECOLOGY

## 2025-02-11 PROCEDURE — 86900 BLOOD TYPING SEROLOGIC ABO: CPT | Performed by: OBSTETRICS & GYNECOLOGY

## 2025-02-11 PROCEDURE — 59025 FETAL NON-STRESS TEST: CPT

## 2025-02-11 PROCEDURE — 27200710 HC EPIDURAL INFUSION PUMP SET: Performed by: STUDENT IN AN ORGANIZED HEALTH CARE EDUCATION/TRAINING PROGRAM

## 2025-02-11 PROCEDURE — 63600175 PHARM REV CODE 636 W HCPCS: Performed by: OBSTETRICS & GYNECOLOGY

## 2025-02-11 PROCEDURE — 87340 HEPATITIS B SURFACE AG IA: CPT | Performed by: OBSTETRICS & GYNECOLOGY

## 2025-02-11 PROCEDURE — C1751 CATH, INF, PER/CENT/MIDLINE: HCPCS | Performed by: STUDENT IN AN ORGANIZED HEALTH CARE EDUCATION/TRAINING PROGRAM

## 2025-02-11 PROCEDURE — 63600175 PHARM REV CODE 636 W HCPCS: Performed by: STUDENT IN AN ORGANIZED HEALTH CARE EDUCATION/TRAINING PROGRAM

## 2025-02-11 PROCEDURE — 80053 COMPREHEN METABOLIC PANEL: CPT | Performed by: OBSTETRICS & GYNECOLOGY

## 2025-02-11 PROCEDURE — 72100002 HC LABOR CARE, 1ST 8 HOURS

## 2025-02-11 PROCEDURE — 10907ZC DRAINAGE OF AMNIOTIC FLUID, THERAPEUTIC FROM PRODUCTS OF CONCEPTION, VIA NATURAL OR ARTIFICIAL OPENING: ICD-10-PCS | Performed by: OBSTETRICS & GYNECOLOGY

## 2025-02-11 PROCEDURE — 0KQM0ZZ REPAIR PERINEUM MUSCLE, OPEN APPROACH: ICD-10-PCS | Performed by: OBSTETRICS & GYNECOLOGY

## 2025-02-11 PROCEDURE — 86593 SYPHILIS TEST NON-TREP QUANT: CPT | Performed by: OBSTETRICS & GYNECOLOGY

## 2025-02-11 RX ORDER — DIPHENOXYLATE HYDROCHLORIDE AND ATROPINE SULFATE 2.5; .025 MG/1; MG/1
2 TABLET ORAL EVERY 6 HOURS PRN
Status: DISCONTINUED | OUTPATIENT
Start: 2025-02-11 | End: 2025-02-13 | Stop reason: HOSPADM

## 2025-02-11 RX ORDER — FAMOTIDINE 10 MG/ML
20 INJECTION INTRAVENOUS ONCE
Status: DISCONTINUED | OUTPATIENT
Start: 2025-02-11 | End: 2025-02-11

## 2025-02-11 RX ORDER — HYDROCORTISONE 25 MG/G
CREAM TOPICAL 3 TIMES DAILY PRN
Status: DISCONTINUED | OUTPATIENT
Start: 2025-02-11 | End: 2025-02-13 | Stop reason: HOSPADM

## 2025-02-11 RX ORDER — METHYLERGONOVINE MALEATE 0.2 MG/ML
200 INJECTION INTRAVENOUS ONCE AS NEEDED
Status: DISCONTINUED | OUTPATIENT
Start: 2025-02-11 | End: 2025-02-11

## 2025-02-11 RX ORDER — ACETAMINOPHEN 325 MG/1
650 TABLET ORAL EVERY 6 HOURS SCHEDULED
Status: DISCONTINUED | OUTPATIENT
Start: 2025-02-12 | End: 2025-02-13 | Stop reason: HOSPADM

## 2025-02-11 RX ORDER — SODIUM CHLORIDE 9 MG/ML
INJECTION, SOLUTION INTRAVENOUS
Status: DISCONTINUED | OUTPATIENT
Start: 2025-02-11 | End: 2025-02-11

## 2025-02-11 RX ORDER — SODIUM CITRATE AND CITRIC ACID MONOHYDRATE 334; 500 MG/5ML; MG/5ML
30 SOLUTION ORAL ONCE
Status: DISCONTINUED | OUTPATIENT
Start: 2025-02-11 | End: 2025-02-11

## 2025-02-11 RX ORDER — SIMETHICONE 80 MG
1 TABLET,CHEWABLE ORAL 4 TIMES DAILY PRN
Status: DISCONTINUED | OUTPATIENT
Start: 2025-02-11 | End: 2025-02-11

## 2025-02-11 RX ORDER — TRANEXAMIC ACID 10 MG/ML
1000 INJECTION, SOLUTION INTRAVENOUS EVERY 30 MIN PRN
Status: DISCONTINUED | OUTPATIENT
Start: 2025-02-11 | End: 2025-02-13 | Stop reason: HOSPADM

## 2025-02-11 RX ORDER — OXYTOCIN-SODIUM CHLORIDE 0.9% IV SOLN 30 UNIT/500ML 30-0.9/5 UT/ML-%
10 SOLUTION INTRAVENOUS ONCE AS NEEDED
Status: DISCONTINUED | OUTPATIENT
Start: 2025-02-11 | End: 2025-02-11

## 2025-02-11 RX ORDER — LIDOCAINE HYDROCHLORIDE AND EPINEPHRINE 15; 5 MG/ML; UG/ML
INJECTION, SOLUTION EPIDURAL
Status: DISCONTINUED | OUTPATIENT
Start: 2025-02-11 | End: 2025-02-11

## 2025-02-11 RX ORDER — OXYTOCIN-SODIUM CHLORIDE 0.9% IV SOLN 30 UNIT/500ML 30-0.9/5 UT/ML-%
30 SOLUTION INTRAVENOUS ONCE AS NEEDED
Status: DISCONTINUED | OUTPATIENT
Start: 2025-02-11 | End: 2025-02-13 | Stop reason: HOSPADM

## 2025-02-11 RX ORDER — HYDROCODONE BITARTRATE AND ACETAMINOPHEN 5; 325 MG/1; MG/1
1 TABLET ORAL EVERY 4 HOURS PRN
Status: DISCONTINUED | OUTPATIENT
Start: 2025-02-11 | End: 2025-02-13 | Stop reason: HOSPADM

## 2025-02-11 RX ORDER — SODIUM CHLORIDE 0.9 % (FLUSH) 0.9 %
10 SYRINGE (ML) INJECTION
Status: DISCONTINUED | OUTPATIENT
Start: 2025-02-11 | End: 2025-02-13 | Stop reason: HOSPADM

## 2025-02-11 RX ORDER — ONDANSETRON 8 MG/1
8 TABLET, ORALLY DISINTEGRATING ORAL EVERY 8 HOURS PRN
Status: DISCONTINUED | OUTPATIENT
Start: 2025-02-11 | End: 2025-02-11

## 2025-02-11 RX ORDER — FENTANYL/BUPIVACAINE/NS/PF 2MCG/ML-.1
PLASTIC BAG, INJECTION (ML) INJECTION CONTINUOUS
Status: DISCONTINUED | OUTPATIENT
Start: 2025-02-11 | End: 2025-02-11

## 2025-02-11 RX ORDER — OXYTOCIN-SODIUM CHLORIDE 0.9% IV SOLN 30 UNIT/500ML 30-0.9/5 UT/ML-%
95 SOLUTION INTRAVENOUS ONCE AS NEEDED
Status: DISCONTINUED | OUTPATIENT
Start: 2025-02-11 | End: 2025-02-11

## 2025-02-11 RX ORDER — PRENATAL WITH FERROUS FUM AND FOLIC ACID 3080; 920; 120; 400; 22; 1.84; 3; 20; 10; 1; 12; 200; 27; 25; 2 [IU]/1; [IU]/1; MG/1; [IU]/1; MG/1; MG/1; MG/1; MG/1; MG/1; MG/1; UG/1; MG/1; MG/1; MG/1; MG/1
1 TABLET ORAL DAILY
Status: DISCONTINUED | OUTPATIENT
Start: 2025-02-12 | End: 2025-02-13 | Stop reason: HOSPADM

## 2025-02-11 RX ORDER — MISOPROSTOL 200 UG/1
800 TABLET ORAL ONCE AS NEEDED
Status: DISCONTINUED | OUTPATIENT
Start: 2025-02-11 | End: 2025-02-11

## 2025-02-11 RX ORDER — DIPHENHYDRAMINE HYDROCHLORIDE 50 MG/ML
25 INJECTION INTRAMUSCULAR; INTRAVENOUS EVERY 4 HOURS PRN
Status: DISCONTINUED | OUTPATIENT
Start: 2025-02-11 | End: 2025-02-13 | Stop reason: HOSPADM

## 2025-02-11 RX ORDER — OXYTOCIN 10 [USP'U]/ML
10 INJECTION, SOLUTION INTRAMUSCULAR; INTRAVENOUS ONCE AS NEEDED
Status: DISCONTINUED | OUTPATIENT
Start: 2025-02-11 | End: 2025-02-11

## 2025-02-11 RX ORDER — IBUPROFEN 400 MG/1
800 TABLET ORAL EVERY 8 HOURS
Status: DISCONTINUED | OUTPATIENT
Start: 2025-02-11 | End: 2025-02-13 | Stop reason: HOSPADM

## 2025-02-11 RX ORDER — CALCIUM CARBONATE 200(500)MG
500 TABLET,CHEWABLE ORAL 3 TIMES DAILY PRN
Status: DISCONTINUED | OUTPATIENT
Start: 2025-02-11 | End: 2025-02-11

## 2025-02-11 RX ORDER — DIPHENOXYLATE HYDROCHLORIDE AND ATROPINE SULFATE 2.5; .025 MG/1; MG/1
2 TABLET ORAL EVERY 6 HOURS PRN
Status: DISCONTINUED | OUTPATIENT
Start: 2025-02-11 | End: 2025-02-11

## 2025-02-11 RX ORDER — OXYTOCIN-SODIUM CHLORIDE 0.9% IV SOLN 30 UNIT/500ML 30-0.9/5 UT/ML-%
95 SOLUTION INTRAVENOUS CONTINUOUS PRN
Status: DISCONTINUED | OUTPATIENT
Start: 2025-02-11 | End: 2025-02-11

## 2025-02-11 RX ORDER — METHYLERGONOVINE MALEATE 0.2 MG/ML
200 INJECTION INTRAVENOUS ONCE AS NEEDED
Status: DISCONTINUED | OUTPATIENT
Start: 2025-02-11 | End: 2025-02-13 | Stop reason: HOSPADM

## 2025-02-11 RX ORDER — OXYTOCIN 10 [USP'U]/ML
10 INJECTION, SOLUTION INTRAMUSCULAR; INTRAVENOUS ONCE AS NEEDED
Status: DISCONTINUED | OUTPATIENT
Start: 2025-02-11 | End: 2025-02-13 | Stop reason: HOSPADM

## 2025-02-11 RX ORDER — OXYTOCIN-SODIUM CHLORIDE 0.9% IV SOLN 30 UNIT/500ML 30-0.9/5 UT/ML-%
95 SOLUTION INTRAVENOUS ONCE AS NEEDED
Status: COMPLETED | OUTPATIENT
Start: 2025-02-11 | End: 2025-02-11

## 2025-02-11 RX ORDER — LIDOCAINE HYDROCHLORIDE 10 MG/ML
10 INJECTION, SOLUTION INFILTRATION; PERINEURAL ONCE AS NEEDED
Status: DISCONTINUED | OUTPATIENT
Start: 2025-02-11 | End: 2025-02-11

## 2025-02-11 RX ORDER — MUPIROCIN 20 MG/G
OINTMENT TOPICAL
OUTPATIENT
Start: 2025-02-11

## 2025-02-11 RX ORDER — MISOPROSTOL 200 UG/1
800 TABLET ORAL ONCE AS NEEDED
Status: DISCONTINUED | OUTPATIENT
Start: 2025-02-11 | End: 2025-02-13 | Stop reason: HOSPADM

## 2025-02-11 RX ORDER — OXYTOCIN-SODIUM CHLORIDE 0.9% IV SOLN 30 UNIT/500ML 30-0.9/5 UT/ML-%
95 SOLUTION INTRAVENOUS CONTINUOUS PRN
Status: DISCONTINUED | OUTPATIENT
Start: 2025-02-11 | End: 2025-02-13 | Stop reason: HOSPADM

## 2025-02-11 RX ORDER — CARBOPROST TROMETHAMINE 250 UG/ML
250 INJECTION, SOLUTION INTRAMUSCULAR
Status: DISCONTINUED | OUTPATIENT
Start: 2025-02-11 | End: 2025-02-11

## 2025-02-11 RX ORDER — ONDANSETRON 8 MG/1
8 TABLET, ORALLY DISINTEGRATING ORAL EVERY 8 HOURS PRN
Status: DISCONTINUED | OUTPATIENT
Start: 2025-02-11 | End: 2025-02-13 | Stop reason: HOSPADM

## 2025-02-11 RX ORDER — CARBOPROST TROMETHAMINE 250 UG/ML
250 INJECTION, SOLUTION INTRAMUSCULAR
Status: DISCONTINUED | OUTPATIENT
Start: 2025-02-11 | End: 2025-02-13 | Stop reason: HOSPADM

## 2025-02-11 RX ORDER — SODIUM CHLORIDE, SODIUM LACTATE, POTASSIUM CHLORIDE, CALCIUM CHLORIDE 600; 310; 30; 20 MG/100ML; MG/100ML; MG/100ML; MG/100ML
INJECTION, SOLUTION INTRAVENOUS CONTINUOUS
Status: DISCONTINUED | OUTPATIENT
Start: 2025-02-11 | End: 2025-02-11

## 2025-02-11 RX ORDER — BUPIVACAINE HYDROCHLORIDE 2.5 MG/ML
INJECTION, SOLUTION EPIDURAL; INFILTRATION; INTRACAUDAL
Status: COMPLETED | OUTPATIENT
Start: 2025-02-11 | End: 2025-02-11

## 2025-02-11 RX ORDER — TRANEXAMIC ACID 10 MG/ML
1000 INJECTION, SOLUTION INTRAVENOUS EVERY 30 MIN PRN
Status: DISCONTINUED | OUTPATIENT
Start: 2025-02-11 | End: 2025-02-11

## 2025-02-11 RX ADMIN — BUPIVACAINE HYDROCHLORIDE 7 ML: 2.5 INJECTION, SOLUTION EPIDURAL; INFILTRATION; INTRACAUDAL; PERINEURAL at 06:02

## 2025-02-11 RX ADMIN — LIDOCAINE HYDROCHLORIDE,EPINEPHRINE BITARTRATE 3 ML: 15; .005 INJECTION, SOLUTION EPIDURAL; INFILTRATION; INTRACAUDAL; PERINEURAL at 05:02

## 2025-02-11 RX ADMIN — ACETAMINOPHEN 650 MG: 325 TABLET ORAL at 11:02

## 2025-02-11 RX ADMIN — IBUPROFEN 800 MG: 400 TABLET ORAL at 09:02

## 2025-02-11 RX ADMIN — Medication 95 MILLI-UNITS/MIN: at 09:02

## 2025-02-11 RX ADMIN — Medication 10 ML/HR: at 05:02

## 2025-02-11 RX ADMIN — SODIUM CHLORIDE, SODIUM LACTATE, POTASSIUM CHLORIDE, AND CALCIUM CHLORIDE: .6; .31; .03; .02 INJECTION, SOLUTION INTRAVENOUS at 04:02

## 2025-02-11 RX ADMIN — Medication 999 MILLI-UNITS/MIN: at 07:02

## 2025-02-11 RX ADMIN — AMPICILLIN 2 G: 2 INJECTION, POWDER, FOR SOLUTION INTRAMUSCULAR; INTRAVENOUS at 05:02

## 2025-02-11 RX ADMIN — BUPIVACAINE HYDROCHLORIDE 5 ML: 2.5 INJECTION, SOLUTION EPIDURAL; INFILTRATION; INTRACAUDAL; PERINEURAL at 05:02

## 2025-02-11 NOTE — NURSING
Patient arrived to unit at this time via Wheelchair. Instructed to change into gown at this time.

## 2025-02-12 LAB
BASOPHILS # BLD AUTO: 0.06 K/UL (ref 0–0.2)
BASOPHILS NFR BLD: 0.4 % (ref 0–1.9)
DIFFERENTIAL METHOD BLD: ABNORMAL
EOSINOPHIL # BLD AUTO: 0.3 K/UL (ref 0–0.5)
EOSINOPHIL NFR BLD: 1.9 % (ref 0–8)
ERYTHROCYTE [DISTWIDTH] IN BLOOD BY AUTOMATED COUNT: 13.2 % (ref 11.5–14.5)
HBV SURFACE AG SERPL QL IA: NORMAL
HCT VFR BLD AUTO: 34.3 % (ref 37–48.5)
HGB BLD-MCNC: 11.5 G/DL (ref 12–16)
HIV 1+2 AB+HIV1 P24 AG SERPL QL IA: NORMAL
IMM GRANULOCYTES # BLD AUTO: 0.12 K/UL (ref 0–0.04)
IMM GRANULOCYTES NFR BLD AUTO: 0.8 % (ref 0–0.5)
LYMPHOCYTES # BLD AUTO: 2.5 K/UL (ref 1–4.8)
LYMPHOCYTES NFR BLD: 17 % (ref 18–48)
MCH RBC QN AUTO: 29.3 PG (ref 27–31)
MCHC RBC AUTO-ENTMCNC: 33.5 G/DL (ref 32–36)
MCV RBC AUTO: 87 FL (ref 82–98)
MONOCYTES # BLD AUTO: 1.3 K/UL (ref 0.3–1)
MONOCYTES NFR BLD: 9 % (ref 4–15)
NEUTROPHILS # BLD AUTO: 10.5 K/UL (ref 1.8–7.7)
NEUTROPHILS NFR BLD: 70.9 % (ref 38–73)
NRBC BLD-RTO: 0 /100 WBC
PLATELET # BLD AUTO: 136 K/UL (ref 150–450)
PMV BLD AUTO: 12.2 FL (ref 9.2–12.9)
RBC # BLD AUTO: 3.93 M/UL (ref 4–5.4)
TREPONEMA PALLIDUM IGG+IGM AB [PRESENCE] IN SERUM OR PLASMA BY IMMUNOASSAY: NONREACTIVE
WBC # BLD AUTO: 14.79 K/UL (ref 3.9–12.7)

## 2025-02-12 PROCEDURE — 25000003 PHARM REV CODE 250: Performed by: OBSTETRICS & GYNECOLOGY

## 2025-02-12 PROCEDURE — 99231 SBSQ HOSP IP/OBS SF/LOW 25: CPT | Mod: ,,, | Performed by: OBSTETRICS & GYNECOLOGY

## 2025-02-12 PROCEDURE — 11000001 HC ACUTE MED/SURG PRIVATE ROOM

## 2025-02-12 PROCEDURE — 36415 COLL VENOUS BLD VENIPUNCTURE: CPT | Performed by: OBSTETRICS & GYNECOLOGY

## 2025-02-12 PROCEDURE — 85025 COMPLETE CBC W/AUTO DIFF WBC: CPT | Performed by: OBSTETRICS & GYNECOLOGY

## 2025-02-12 RX ADMIN — ACETAMINOPHEN 650 MG: 325 TABLET ORAL at 11:02

## 2025-02-12 RX ADMIN — ACETAMINOPHEN 650 MG: 325 TABLET ORAL at 05:02

## 2025-02-12 RX ADMIN — IBUPROFEN 800 MG: 400 TABLET ORAL at 04:02

## 2025-02-12 RX ADMIN — IBUPROFEN 800 MG: 400 TABLET ORAL at 02:02

## 2025-02-12 RX ADMIN — PRENATAL VITAMINS-IRON FUMARATE 27 MG IRON-FOLIC ACID 0.8 MG TABLET 1 TABLET: at 08:02

## 2025-02-12 RX ADMIN — IBUPROFEN 800 MG: 400 TABLET ORAL at 09:02

## 2025-02-12 NOTE — HOSPITAL COURSE
Pt noted to be 5 cm upon presentation with regular ctx's.  AROM done, mild meconium noted.  Pt received epidural.  Pt received ampicillin GBS prophylaxis.  Pt had uncomplicated VAVD with repair of 2nd deg laceration.    PP course uncomplicated.

## 2025-02-12 NOTE — L&D DELIVERY NOTE
[facility]  Vaginal Delivery   Operative Note    SUMMARY     Vacuum assisted vaginal delivery of live infant, was placed on mothers abdomen for skin to skin and bulb suctioning performed.  Infant delivered position OA over perineum.  Nuchal cord: No.    Spontaneous delivery of placenta and IV pitocin given noting good uterine tone.  2nd degree laceration noted and repaired in normal fashion with 3-0 vicryl .  Patient tolerated delivery well. Sponge needle and lap counted correctly x2.    Indications: Pain during labor  Pregnancy complicated by:   Patient Active Problem List   Diagnosis    Encounter for supervision of normal primigravida in third trimester, antepartum    Vacuum-assisted vaginal delivery     Admitting GA: 37w0d    Delivery Information for Jhonny Nicole    Birth information:  YOB: 2024   Time of birth: 7:00 PM   Sex: male   Head Delivery Date/Time: 2025  7:00 PM   Delivery type: Vaginal, Vacuum (Extractor)   Gestational Age: 37w0d       Delivery Providers    Delivering clinician: Luis M Mello MD   Provider Role    Luis M Mello MD Socha, Elizabeth, RN Dupre, Mary G, RN Wilson, Latoya, RN Killian, Eugenie, Linda Hackett RN               Measurements    Weight: 3050 g  Weight (lbs): 6 lb 11.6 oz  Length:          Apgars    Living status: Living  Apgar Component Scores:  1 min.:  5 min.:  10 min.:  15 min.:  20 min.:    Skin color:  1  1       Heart rate:  2  2       Reflex irritability:  2  2       Muscle tone:  2  2       Respiratory effort:  2  2       Total:  9  9       Apgars assigned by: ZAIRA VILLATORO         Operative Delivery    Forceps attempted?: No  Vacuum extractor attempted?: Yes  Vacuum type: Kiwi  Number of pop offs: 0  Number of pulls with vacuum: 1  Total vacuum application time: 33 seconds  Vacuum applied by: LUIS M MELLO MD  Failed vacuum delivery?: No         Shoulder Dystocia    Shoulder dystocia  present?: No           Presentation    Presentation: Vertex  Position: Occiput Anterior           Interventions/Resuscitation    Method: Bulb Suctioning, Tactile Stimulation       Cord    Vessels: 3 vessels  Complications: None  Delayed Cord Clamping?: No  Cord Clamped Date/Time: 2025  7:01 PM  Cord Blood Disposition: Lab  Gases Sent?: No  Stem Cell Collection (by MD): No       Placenta    Placenta delivery date/time: 2025 1903  Placenta removal: Spontaneous  Placenta appearance: Intact  Placenta disposition: Discarded           Labor Events:       labor: No     Labor Onset Date/Time:         Dilation Complete Date/Time:         Start Pushing Date/Time:         Start Pushing Date/Time:       Rupture Date/Time: 25 1641        Rupture type: ARM (Artificial Rupture)        Fluid Amount:       Fluid Color: Meconium Thin              steroids: None     Antibiotics given for GBS: Yes     Induction: none     Indications for induction:        Augmentation: amniotomy     Indications for augmentation:       Labor complications: None     Additional complications:          Cervical ripening:                     Delivery:      Episiotomy: None     Indication for Episiotomy:       Perineal Lacerations: 2nd Repaired:  Yes   Periurethral Laceration:   Repaired:     Labial Laceration:   Repaired:     Sulcus Laceration:   Repaired:     Vaginal Laceration:   Repaired:     Cervical Laceration:   Repaired:     Repair suture:       Repair # of packets: 1     Last Value - EBL - Nursing (mL):       Sum - EBL - Nursing (mL): 0     Last Value - EBL - Anesthesia (mL):      Calculated QBL (mL): 100     Running total QBL (mL): 114     Vaginal Sweep Performed: Yes     Surgicount Correct: Yes     Vaginal Packing: No Quantity:       Other providers:       Anesthesia    Method: Epidural          Details (if applicable):  Trial of Labor      Categorization:      Priority:     Indications for  :     Incision Type:       Additional  information:  Forceps:    Vacuum:    Breech:    Observed anomalies    Other (Comments):

## 2025-02-12 NOTE — PROGRESS NOTES
[facility]  Obstetrics  Postpartum Progress Note    Patient Name: Ying Nicole  MRN: [id number]  Admission Date: 2/11/2025  Hospital Length of Stay: 1 days  Attending Physician: Luis M Renee MD  Primary Care Provider: Simi, Primary Doctor    Subjective:     Principal Problem:Pain during labor    Hospital Course:  Pt noted to be 5 cm upon presentation with regular ctx's.  AROM done, mild meconium noted.  Pt received epidural.  Pt received ampicillin GBS prophylaxis.  Pt had uncomplicated VAVD with repair of 2nd deg laceration.    PP course uncomplicated.    Interval History: PPD #1 s/p VAVD    She is doing well this morning. She is tolerating a regular diet without nausea or vomiting. She is voiding spontaneously. She is ambulating. She has passed flatus, and has not a BM. Vaginal bleeding is mild. She denies fever or chills. Abdominal pain is mild and controlled with oral medications. She Is breastfeeding. She desires circumcision for her male baby: yes.    Objective:     Vital Signs (Most Recent):  Temp: 97.5 °F (36.4 °C) (02/12/25 0452)  Pulse: 63 (02/12/25 0452)  Resp: 18 (02/12/25 0452)  BP: (!) 102/58 (02/12/25 0452)  SpO2: 97 % (02/12/25 0452) Vital Signs (24h Range):  Temp:  [97.5 °F (36.4 °C)-99.5 °F (37.5 °C)] 97.5 °F (36.4 °C)  Pulse:  [] 63  Resp:  [18-20] 18  SpO2:  [90 %-99 %] 97 %  BP: (102-143)/(54-83) 102/58     Weight: 69.9 kg (154 lb)  Body mass index is 30.08 kg/m².      Intake/Output Summary (Last 24 hours) at 2/12/2025 0713  Last data filed at 2/12/2025 0619  Gross per 24 hour   Intake 1145.06 ml   Output 2014 ml   Net -868.94 ml         Significant Labs:  Lab Results   Component Value Date    GROUPTRH A POS 02/11/2025    HEPBSAG Non-reactive 09/13/2024     Recent Labs   Lab 02/12/25  0525   HGB 11.5*   HCT 34.3*       I have personallly reviewed all pertinent lab results from the last 24 hours.    Physical Exam:   Constitutional: She is oriented to person, place,  and time. She appears well-developed and well-nourished. No distress.    HENT:   Head: Normocephalic and atraumatic.     Neck: No thyromegaly present.     Pulmonary/Chest: Effort normal. No respiratory distress.        Abdominal: Soft. She exhibits no distension and no mass. There is no abdominal tenderness. There is no rebound and no guarding.             Musculoskeletal: Normal range of motion and moves all extremeties. No tenderness.       Neurological: She is alert and oriented to person, place, and time. No cranial nerve deficit. Coordination normal.    Skin: She is not diaphoretic.    Psychiatric: She has a normal mood and affect. Her behavior is normal. Judgment and thought content normal.         Assessment/Plan:     24 y.o. female  for:    Vacuum-assisted vaginal delivery  Routine pp care          Disposition: As patient meets milestones, will plan to discharge tomorrow.    Luis M Renee MD  Obstetrics  [facility]

## 2025-02-12 NOTE — SUBJECTIVE & OBJECTIVE
Obstetric HPI:  Patient reports Intensity: moderate contractions, active fetal movement, No vaginal bleeding , No loss of fluid     This pregnancy has been complicated by late PNC.    OB History    Para Term  AB Living   1 0 0 0 0 0   SAB IAB Ectopic Multiple Live Births   0 0 0 0 0      # Outcome Date GA Lbr Ashwin/2nd Weight Sex Type Anes PTL Lv   1 Current              No past medical history on file.  No past surgical history on file.    PTA Medications   Medication Sig    prenatal vit 10-iron-folic-dha (VITAFOL-OB+DHA) 65-1-250 mg Cmpk Take 1 tablet by mouth once daily.       Review of patient's allergies indicates:  No Known Allergies     Family History    None       Tobacco Use    Smoking status: Never     Passive exposure: Never    Smokeless tobacco: Never   Substance and Sexual Activity    Alcohol use: Never    Drug use: Never    Sexual activity: Yes     Partners: Male     Review of Systems   Constitutional: Negative.    Respiratory: Negative.     Cardiovascular: Negative.    Gastrointestinal: Negative.    Endocrine: Negative.    Genitourinary: Negative.    Musculoskeletal: Negative.    Neurological: Negative.    Psychiatric/Behavioral: Negative.     Breast: negative.       Objective:     Vital Signs (Most Recent):  Temp: 99.2 °F (37.3 °C) (25 1638)  Pulse: 76 (25 1900)  Resp: 18 (25 1638)  BP: (!) 143/72 (25 1800)  SpO2: 95 % (25 1900) Vital Signs (24h Range):  Temp:  [99.2 °F (37.3 °C)] 99.2 °F (37.3 °C)  Pulse:  [72-85] 76  Resp:  [18] 18  SpO2:  [95 %-99 %] 95 %  BP: (133-143)/(72-83) 143/72     Weight: 69.9 kg (154 lb)  Body mass index is 30.08 kg/m².    FHT: 145, reactive Cat 1 (reassuring)  TOCO:  Q 3 minutes     Physical Exam:   Constitutional: She is oriented to person, place, and time. She appears well-developed and well-nourished. No distress.    HENT:   Head: Normocephalic and atraumatic.     Neck: No thyromegaly present.     Pulmonary/Chest: Effort  normal. No respiratory distress.        Abdominal: Soft. She exhibits no distension and no mass. There is no abdominal tenderness. There is no rebound and no guarding.             Musculoskeletal: Normal range of motion and moves all extremeties. No tenderness.       Neurological: She is alert and oriented to person, place, and time. No cranial nerve deficit. Coordination normal.    Skin: She is not diaphoretic.    Psychiatric: She has a normal mood and affect. Her behavior is normal. Judgment and thought content normal.        Cervix:  Dilation:  6  Effacement:  100%  Station: -2  Presentation: Vertex     Significant Labs:  Lab Results   Component Value Date    [facility]    HEPBSAG Non-reactive 09/13/2024       I have personallly reviewed all pertinent lab results from the last 24 hours.

## 2025-02-12 NOTE — SUBJECTIVE & OBJECTIVE
Interval History: PPD #1 s/p VAVD    She is doing well this morning. She is tolerating a regular diet without nausea or vomiting. She is voiding spontaneously. She is ambulating. She has passed flatus, and has not a BM. Vaginal bleeding is mild. She denies fever or chills. Abdominal pain is mild and controlled with oral medications. She Is breastfeeding. She desires circumcision for her male baby: yes.    Objective:     Vital Signs (Most Recent):  Temp: 97.5 °F (36.4 °C) (02/12/25 0452)  Pulse: 63 (02/12/25 0452)  Resp: 18 (02/12/25 0452)  BP: (!) 102/58 (02/12/25 0452)  SpO2: 97 % (02/12/25 0452) Vital Signs (24h Range):  Temp:  [97.5 °F (36.4 °C)-99.5 °F (37.5 °C)] 97.5 °F (36.4 °C)  Pulse:  [] 63  Resp:  [18-20] 18  SpO2:  [90 %-99 %] 97 %  BP: (102-143)/(54-83) 102/58     Weight: 69.9 kg (154 lb)  Body mass index is 30.08 kg/m².      Intake/Output Summary (Last 24 hours) at 2/12/2025 0713  Last data filed at 2/12/2025 0619  Gross per 24 hour   Intake 1145.06 ml   Output 2014 ml   Net -868.94 ml         Significant Labs:  Lab Results   Component Value Date    GROUPTRH A POS 02/11/2025    HEPBSAG Non-reactive 09/13/2024     Recent Labs   Lab 02/12/25  0525   HGB 11.5*   HCT 34.3*       I have personallly reviewed all pertinent lab results from the last 24 hours.    Physical Exam:   Constitutional: She is oriented to person, place, and time. She appears well-developed and well-nourished. No distress.    HENT:   Head: Normocephalic and atraumatic.     Neck: No thyromegaly present.     Pulmonary/Chest: Effort normal. No respiratory distress.        Abdominal: Soft. She exhibits no distension and no mass. There is no abdominal tenderness. There is no rebound and no guarding.             Musculoskeletal: Normal range of motion and moves all extremeties. No tenderness.       Neurological: She is alert and oriented to person, place, and time. No cranial nerve deficit. Coordination normal.    Skin: She is not  diaphoretic.    Psychiatric: She has a normal mood and affect. Her behavior is normal. Judgment and thought content normal.

## 2025-02-12 NOTE — NURSING
Pt moved to room 210 via mil jesus. Pt feels her legs are too numb to walk. Assisted into bed. New pad, panties, and gown on pt. CHELA Montana at bedside.

## 2025-02-12 NOTE — DISCHARGE INSTRUCTIONS
After a Vaginal Birth    General Discharge Instructions    May follow a regular diet, unless otherwise discussed with physician.  Take showers, not baths unless otherwise discussed with physician.  Activity as tolerated.  No lifting or heavy exercise for 6 weeks, no driving for 2 weeks, no sexual intercourse, douching or tampons for 6 weeks  May return to work/school as discussed with physician  Take medications as directed  Discuss birth control with physician  Breast care support bra worn at all times  Lactation consultant referral number ( [phone] or [phone])    Call Your Healthcare Provider Right Away If You Have:  A temperature of 100.4°F or higher.  If your blood pressure is over 140/90.  You have difficulty catching your breath or trouble breathing.  Heavy vaginal bleeding, clots, or vaginal discharge with foul odor. (heavier than menses)  Persistent nausea or vomiting.  You gain more than 3 pounds in 3 days.  Severe headaches not relieved by Tylenol (acetaminophen) or Motrin (ibuprofen)  Blurry or double vision, see spots or flashing lights.  Dizziness or fainting.  New onset swelling or worsening of existing swelling.  Burning or pain when you urinate.  No bowel movement for 5 days.  Redness, warmth, swelling, or pain in the lower leg.  Redness, discharge, or pain worse than you had in the hospital.  Burning, pain, red streaks, or lumpy areas in your breasts.  Cracks, blisters, or blood on your nipples.  Feelings of extreme sadness or anxiety, or a feeling that you dont want to be with your baby.  If you have any new or unusual symptoms or have questions or concerns    Some of these symptoms can occur up to 4 to 6 weeks after delivery. This can be a sign of pre-eclampsia, which is a serious disease that can cause stroke, seizures, organ damage, or death. Do not wait to call your doctor or seek medical attention.    If You Had Stitches  You may have received stitches in the skin near your vagina.  The stitches might have closed an episiotomy (an incision that enlarges the opening of the vagina). Or you may have needed stitches to repair torn skin. Either way, your stitches should dissolve within weeks. Until then, you can help reduce discomfort, aid healing, and reduce your risk of infection by keeping the stitches clean. These tips can help:    Gently wipe from front to back after you urinate or have a bowel movement.  After wiping, spray warm water on the area. Or you can have a sitz bath. This means sitting in a tub with a few inches of water in it. Then pat the area dry or use a hairdryer on a cool setting.  You can take a shower instead of a bath.  Change sanitary pads at least every 2-4 hours.  Place cold or heat packs on the area as directed by your doctors or nurses. Keep a thin towel between the pack and your skin.  Sit on firm seats so the stitches pull less.     Follow-Up  Schedule a  follow-up exam with your healthcare provider for about 6 weeks after delivery. During this exam, your uterus and vaginal area will be checked. Contact your healthcare provider if you think you or your baby are having any problems.                        Instrucciones de randi de lactancia    [facility] Servicios de Apoyo a la Lactancia Materna [phone]     La [employer] recomienda la lactancia materna exclusiva jaquelin los primeros 6 meses de wilbert y la continuación de la lactancia con la introducción de alimentos complementarios más allá del primer año de wilbert. La [employer] la [place] y la [employer] recomiendan retrasar el uso del biberón y el chupete hasta después de las 4 semanas de edad y la lactancia esté kyung establecida. La [employer] recomienda el uso de un chupete a la hora de la siesta y la hora de acostarse, sathish radha estrategia de reducción de SMSL, para recién nacidos  "amamantados solo después de 1 mes de edad y la lactancia materna se ha establecido firmemente.   Alimente al bebé a la primera señal de hambre o comodidad  o [place] a la boca, movimientos de succión  o [place]ando o buscando algo para chupar  o No espere a llorar - no es radha señal tardía de hambre; es señal de angustia   Las alimentaciones pueden ser de 8 a 12 veces por 24 horas y no seguirán un horario   Alterne el seno con el que comienza la alimentación o comience con el seno que se siente más lleno   Cambie de seno cuando el bebé se herbert del seno o se queda dormido   Continúe ofreciéndole los senos hasta que el bebé se benita lleno, ya no dé señales de hambre y permanezca dormido cuando lo coloque boca arriba en la cuna.   Si el bebé tiene sueño y no se despierta para alimentarlo, colóquelo piel con piel vestido con un pañal contra el pecho desnudo de la madre.   Duerma cerca de singh bebé   El bebé debe colocarse y agarrarse al pecho correctamente  o Pecho contra pecho, mentón en el pecho  o Los labios del bebé están "volteados" hacia afuera  o La boca del bebé está completamente abierta sathish un grito  o La succión del bebé debe sentirse sathish si estuviera tirando de la madre.  ? El bebé debe beber del pecho:  o Debería oír tragar o tragar saliva jaquelin la alimentación.  o Debería moncho leche en los labios del bebé cuando se herbert el pecho  o Tasha senos deben estar más suaves cuando el bebé termine de mamar  o El bebé debe verse relajado al final de las monica.  o Después del 4to día y singh leche está en:  o La jorge del bebé debe volverse de color amarillo brillante y estar suelta, acuosa y con semillas  o El bebé debe tener al menos 3-4 cacas del tamaño de la torre de singh mano por día  o El bebé debe tener al menos 6-8 pañales mojados por día  o La orina debe ser de color amarillo noy  Debe beber cuando tenga sed y comer radha dieta saludable cuando tenga  hambriento.  Livia siestas para obtener el descanso que " necesitas.  [place] medicamentos y/o juan alcohol solo con el permiso de singh obstetra  o el pediatra del bebé. También puede llamar al [facility] de [place] [facility],  ([phone]), de lunes a viernes, de 8 a. m. a 5 p. m., [facility], para aprovechar al radha  información actualizada basada en evidencia sobre el uso de medicamentos jaquelin  embarazo y lactancia.    El bebé debe ser examinado por un pediatra a los 3-5 días de nacido; a menos que lo ordene antes el pediatra.  Aga vez que le baje la leche, el bebé debe volver al peso de nacimiento a más tardar entre los 10 y 14 días de nacido.    Si tiene problemas con la lactancia o tiene alguna pregunta sobre la lactancia, llame a los servicios de apoyo a la lactancia de [facility] al [phone] de lunes a viernes de 9 a. m. a 5 p. m.    Recursos para la lactancia:    Bebé Café: [phone]    Liga de [place]: [phone]-[phone]    [facility] de Lactancia Materna de [facility] Baby Café: [email or web address]      Ingurgitación primaria:    Si la leche fluye, aplique calor húmedo o seco en los senos jaquelin aproximadamente 10 minutos antes de cada chasidy sathish medida de comodidad para facilitar el reflejo de eyección de la leche.    Siga el tratamiento térmico con un masaje de senos para suavizar las áreas duras o con bultos del seno.    Use alimentaciones sin restricciones, frecuentes y efectivas    Despierta al bebé para alimentarlo si es necesario.    Evite la alimentación con chupete y biberón    Extracción manual o bomba de senos hasta el punto de comodidad según sea necesario    Use tratamientos fríos en forma de bolsas de hielo/bolsas de gel/verduras congeladas envueltas en un paño suave y servin y aplíquelas en los senos jaquelin aproximadamente 20 minutos después de cada alimentación hasta que se resuelva la congestión.    Use un sostén cómodo y de apoyo.    [place] analgésicos según sea necesario    Use medicamentos  antiinflamatorios si los prescribe el médico.

## 2025-02-12 NOTE — LACTATION NOTE
Reviewed basic breastfeeding instructions via AMN interpretor, Yee (#[id number]). Encouraged to call me for assistance with baby's next feeding. Patient verbalizes understanding of all instructions with good recall.

## 2025-02-12 NOTE — NURSING
[id number] Pericare provided, clean gown change, epidural removed. Peripad and underwear applied. Pt instructed to call RN for ambulation out of bed. Pt verbalized understanding. Call bell within reach and pt instructed on use.    utilized (jazmyn ID#[id number])

## 2025-02-12 NOTE — PLAN OF CARE
Pt. tolerating pain with scheduled medications. Tolerating diet.  Enc. Pt. To ambulate today. poc reviewed with pt. Bonding well with infant. Vss. Friend at bedside throughout the day.

## 2025-02-12 NOTE — H&P
[facility] [place] [facility]  Obstetrics  History & Physical    Patient Name: Ying Nicole  MRN: [id number]  Admission Date: 2025  Primary Care Provider: No, Primary Doctor    Subjective:     Principal Problem:Pain during labor    History of Present Illness:  24 yr G1 @ 37 wk 0 d presenting to L&D in active labor.  Preg complicated by late PNC and unknown GBS status.    Obstetric HPI:  Patient reports Intensity: moderate contractions, active fetal movement, No vaginal bleeding , No loss of fluid     This pregnancy has been complicated by late PNC.    OB History    Para Term  AB Living   1 0 0 0 0 0   SAB IAB Ectopic Multiple Live Births   0 0 0 0 0      # Outcome Date GA Lbr Ashwin/2nd Weight Sex Type Anes PTL Lv   1 Current              No past medical history on file.  No past surgical history on file.    PTA Medications   Medication Sig    prenatal vit 10-iron-folic-dha (VITAFOL-OB+DHA) 65-1-250 mg Cmpk Take 1 tablet by mouth once daily.       Review of patient's allergies indicates:  No Known Allergies     Family History    None       Tobacco Use    Smoking status: Never     Passive exposure: Never    Smokeless tobacco: Never   Substance and Sexual Activity    Alcohol use: Never    Drug use: Never    Sexual activity: Yes     Partners: Male     Review of Systems   Constitutional: Negative.    Respiratory: Negative.     Cardiovascular: Negative.    Gastrointestinal: Negative.    Endocrine: Negative.    Genitourinary: Negative.    Musculoskeletal: Negative.    Neurological: Negative.    Psychiatric/Behavioral: Negative.     Breast: negative.       Objective:     Vital Signs (Most Recent):  Temp: 99.2 °F (37.3 °C) (25 1638)  Pulse: 76 (25 1900)  Resp: 18 (25 1638)  BP: (!) 143/72 (25 1800)  SpO2: 95 % (25 1900) Vital Signs (24h Range):  Temp:  [99.2 °F (37.3 °C)] 99.2 °F (37.3 °C)  Pulse:  [72-85] 76  Resp:  [18] 18  SpO2:  [95 %-99 %] 95 %  BP:  (133-143)/(72-83) 143/72     Weight: 69.9 kg (154 lb)  Body mass index is 30.08 kg/m².    FHT: 145, reactive Cat 1 (reassuring)  TOCO:  Q 3 minutes     Physical Exam:   Constitutional: She is oriented to person, place, and time. She appears well-developed and well-nourished. No distress.    HENT:   Head: Normocephalic and atraumatic.     Neck: No thyromegaly present.     Pulmonary/Chest: Effort normal. No respiratory distress.        Abdominal: Soft. She exhibits no distension and no mass. There is no abdominal tenderness. There is no rebound and no guarding.             Musculoskeletal: Normal range of motion and moves all extremeties. No tenderness.       Neurological: She is alert and oriented to person, place, and time. No cranial nerve deficit. Coordination normal.    Skin: She is not diaphoretic.    Psychiatric: She has a normal mood and affect. Her behavior is normal. Judgment and thought content normal.        Cervix:  Dilation:  6  Effacement:  100%  Station: -2  Presentation: Vertex     Significant Labs:  Lab Results   Component Value Date    GROUPTRH A POS 2025    HEPBSAG Non-reactive 2024       I have personallly reviewed all pertinent lab results from the last 24 hours.  Assessment/Plan:     24 y.o. female  at 37w0d for:    * Pain during labor  Pt requesting epidural    Pregnancy with 37 weeks completed gestation  Anticipate , if any evidence of fetal non-reassurance, pt will be considered for Csec.        Luis M Renee MD  Obstetrics  [facility] - [place] - Labor & Delivery

## 2025-02-12 NOTE — ANESTHESIA PROCEDURE NOTES
Epidural    Patient location during procedure: OB   Reason for block: primary anesthetic   Reason for block: labor analgesia requested by patient and obstetrician  Diagnosis: IUP   Start time: 2/11/2025 5:21 PM  Timeout: 2/11/2025 5:21 PM  End time: 2/11/2025 5:33 PM    Staffing  Performing Provider: Ryley Freitas MD  Authorizing Provider: Ryley Freitas MD    Staffing  Performed by: Ryley Freitas MD  Authorized by: Ryley Freitas MD        Preanesthetic Checklist  Completed: patient identified, IV checked, site marked, risks and benefits discussed, surgical consent, monitors and equipment checked, pre-op evaluation, timeout performed, anesthesia consent given, hand hygiene performed and patient being monitored  Preparation  Patient position: sitting  Prep: ChloraPrep  Patient monitoring: Pulse Ox and Blood Pressure  Reason for block: primary anesthetic   Epidural  Skin Anesthetic: lidocaine 1%  Skin Wheal: 3 mL  Administration type: continuous  Approach: midline  Interspace: L3-4    Injection technique: RICCO saline  Needle and Epidural Catheter  Needle type: Tuohy   Needle gauge: 17  Needle length: 3.5 inches  Needle insertion depth: 6 cm  Catheter type: springwound and multi-orifice  Catheter size: 19 G  Catheter at skin depth: 11 cm  Insertion Attempts: 1  Test dose: 3 mL of lidocaine 1.5% with Epi 1-to-200,000  Additional Documentation: no paresthesia on injection, incremental injection, no significant pain on injection, negative aspiration for heme and CSF, no signs/symptoms of IV or SA injection and no significant complaints from patient  Needle localization: anatomical landmarks  Assessment  Upper dermatomal levels - Left: T10  Right: T10   Dermatomal levels determined by pinch or prick  Ease of block: easy  Patient's tolerance of the procedure: comfortable throughout block and no complaints No inadvertent dural puncture with Tuohy.  Dural puncture not performed with spinal  needle    Medications:    Medications: bupivacaine (pf) (MARCAINE) injection 0.25% - Epidural   5 mL - 2/11/2025 5:28:00 PM

## 2025-02-12 NOTE — HPI
24 yr G1 @ 37 wk 0 d presenting to L&D in active labor.  Preg complicated by late PNC and unknown GBS status.

## 2025-02-12 NOTE — PLAN OF CARE
PT stable.  VS WDL.  Voiding and ambulating independently.  Fundus firm, midline 1 below, light bleeding.  Pain managed with ibuprofen 800, and tylenol 650.  Tolerating regular diet.  IV SL.  Bonding well with baby.  Pt doing well.

## 2025-02-12 NOTE — ANESTHESIA PREPROCEDURE EVALUATION
2025  Ying Nicole is a 24 y.o., female.      Pre-op Assessment    I have reviewed the Patient Summary Reports.     I have reviewed the Nursing Notes. I have reviewed the NPO Status.   I have reviewed the Medications.     Review of Systems  Anesthesia Hx:  No problems with previous Anesthesia                Social:  No Alcohol Use, Non-Smoker       Cardiovascular:  Cardiovascular Normal                                              Pulmonary:  Pulmonary Normal                       Hepatic/GI:  Hepatic/GI Normal                    OB/GYN/PEDS:             Neurological:  Neurology Normal                                      Endocrine:  Endocrine Normal                Physical Exam  General: Well nourished, Cooperative, Alert and Oriented    Airway:  Mallampati: II / II  Mouth Opening: Normal  TM Distance: Normal  Tongue: Normal  Neck ROM: Normal ROM    Dental:  Intact    Chest/Lungs:  Normal Respiratory Rate    Heart:  Rate: Normal  Rhythm: Regular Rhythm        Anesthesia Plan  Type of Anesthesia, risks & benefits discussed:    Anesthesia Type: Epidural  Intra-op Monitoring Plan: Standard ASA Monitors  Informed Consent: Informed consent signed with the Patient and all parties understand the risks and agree with anesthesia plan.  All questions answered. Patient consented to blood products? No  ASA Score: 2    Ready For Surgery From Anesthesia Perspective.     .

## 2025-02-13 ENCOUNTER — PATIENT MESSAGE (OUTPATIENT)
Dept: OBSTETRICS AND GYNECOLOGY | Facility: HOSPITAL | Age: 25
End: 2025-02-13
Payer: MEDICAID

## 2025-02-13 VITALS
DIASTOLIC BLOOD PRESSURE: 54 MMHG | OXYGEN SATURATION: 96 % | RESPIRATION RATE: 18 BRPM | WEIGHT: 154 LBS | BODY MASS INDEX: 30.23 KG/M2 | TEMPERATURE: 99 F | HEART RATE: 60 BPM | SYSTOLIC BLOOD PRESSURE: 119 MMHG | HEIGHT: 60 IN

## 2025-02-13 PROCEDURE — 99238 HOSP IP/OBS DSCHRG MGMT 30/<: CPT | Mod: ,,, | Performed by: OBSTETRICS & GYNECOLOGY

## 2025-02-13 PROCEDURE — 25000003 PHARM REV CODE 250: Performed by: OBSTETRICS & GYNECOLOGY

## 2025-02-13 RX ORDER — IBUPROFEN 800 MG/1
800 TABLET ORAL EVERY 8 HOURS
Qty: 40 TABLET | Refills: 0 | Status: SHIPPED | OUTPATIENT
Start: 2025-02-13

## 2025-02-13 RX ADMIN — ACETAMINOPHEN 650 MG: 325 TABLET ORAL at 06:02

## 2025-02-13 RX ADMIN — PRENATAL VITAMINS-IRON FUMARATE 27 MG IRON-FOLIC ACID 0.8 MG TABLET 1 TABLET: at 08:02

## 2025-02-13 RX ADMIN — IBUPROFEN 800 MG: 400 TABLET ORAL at 05:02

## 2025-02-13 RX ADMIN — ACETAMINOPHEN 650 MG: 325 TABLET ORAL at 11:02

## 2025-02-13 NOTE — DISCHARGE SUMMARY
[place] - Mother & Baby  Obstetrics  Discharge Summary      Patient Name: Ying Nicole  MRN: [id number]  Admission Date: 2/11/2025  Hospital Length of Stay: 2 days  Discharge Date and Time:  02/13/2025 12:43 PM  Attending Physician: Luis M Renee MD   Discharging Provider: Luis M Renee MD   Primary Care Provider: No, Primary Doctor    HPI: 24 yr G1 @ 37 wk 0 d presenting to L&D in active labor.  Preg complicated by late PNC and unknown GBS status.        * No surgery found *     Hospital Course:   Pt noted to be 5 cm upon presentation with regular ctx's.  AROM done, mild meconium noted.  Pt received epidural.  Pt received ampicillin GBS prophylaxis.  Pt had uncomplicated VAVD with repair of 2nd deg laceration.    PP course uncomplicated.         Final Active Diagnoses:    Diagnosis Date Noted POA    PRINCIPAL PROBLEM:  Vacuum-assisted vaginal delivery [Z37.9] 02/11/2025 Yes      Problems Resolved During this Admission:    Diagnosis Date Noted Date Resolved POA    Pain during labor [O99.892, R52] 02/11/2025 02/11/2025 Yes    Pregnancy with 37 weeks completed gestation [Z3A.37] 02/11/2025 02/11/2025 Not Applicable        Significant Diagnostic Studies: Labs: CBC   Recent Labs   Lab 02/11/25  1640 02/12/25  0525   WBC 13.79* 14.79*   HGB 13.7 11.5*   HCT 40.0 34.3*    136*         Feeding Method: both breast and bottle    Immunizations       Date Immunization Status Dose Route/Site Given by    02/11/25 2024 MMR Incomplete 0.5 mL Subcutaneous/     02/11/25 2024 Tdap Incomplete 0.5 mL Intramuscular/             Delivery:    Episiotomy: None   Lacerations: 2nd   Repair suture:     Repair # of packets: 1   Blood loss (ml):       Birth information:  YOB: 2024   Time of birth: 7:00 PM   Sex: male   Delivery type: Vaginal, Vacuum (Extractor)   Gestational Age: 37w0d     Measurements    Weight: 3050 g  Weight (lbs): 6 lb 11.6 oz  Length:          Delivery Clinician: Delivery Providers     Delivering clinician: Luis M Renee MD   Provider Role    Luis M Renee MD Socha, Elizabeth, RN Dupre, Mary G, RN Wilson, Latoya, RN Killian, Eugenie, Linda Hackett RN              Additional  information:  Forceps:    Vacuum:    Breech:    Observed anomalies      Living?:     Apgars    Living status: Living  Apgar Component Scores:  1 min.:  5 min.:  10 min.:  15 min.:  20 min.:    Skin color:  1  1       Heart rate:  2  2       Reflex irritability:  2  2       Muscle tone:  2  2       Respiratory effort:  2  2       Total:  9  9       Apgars assigned by: ZAIRA VILLATORO         Placenta: Delivered:       appearance  Pending Diagnostic Studies:       None            Discharged Condition: good    Disposition: Home or Self Care    Follow Up:   Follow-up Information       Luis M Renee MD Follow up in 6 week(s).    Specialties: Obstetrics, Obstetrics and Gynecology  Why: postpartum care  Contact information:  [address]  [address] [place], [place] [address] [phone]                           Patient Instructions:      BREAST PUMP FOR HOME USE     Order Specific Question Answer Comments   Type of pump: Electric    Weight: 69.9 kg (154 lb)    Length of need (1-99 months): 99      Diet Adult Regular     Pelvic Rest     Lifting restrictions     Notify your health care provider if you experience any of the following:  temperature >100.4     Notify your health care provider if you experience any of the following:  persistent nausea and vomiting or diarrhea     Notify your health care provider if you experience any of the following:  severe uncontrolled pain     Notify your health care provider if you experience any of the following:  redness, tenderness, or signs of infection (pain, swelling, redness, odor or green/yellow discharge around incision site)     Notify your health care provider if you experience any of the following:  difficulty breathing or increased cough     Notify your  health care provider if you experience any of the following:  severe persistent headache     Notify your health care provider if you experience any of the following:  persistent dizziness, light-headedness, or visual disturbances     Notify your health care provider if you experience any of the following:  increased confusion or weakness     Weight bearing restrictions (specify):     Medications:  Current Discharge Medication List        START taking these medications    Details   ibuprofen (ADVIL,MOTRIN) 800 MG tablet Take 1 tablet (800 mg total) by mouth every 8 (eight) hours.  Qty: 40 tablet, Refills: 0           CONTINUE these medications which have NOT CHANGED    Details   prenatal vit 10-iron-folic-dha (VITAFOL-OB+DHA) 65-1-250 mg Cmpk Take 1 tablet by mouth once daily.  Qty: 30 each, Refills: 11    Comments: Any covered PNV  Associated Diagnoses: Encounter for supervision of normal primigravida in second trimester, antepartum; Pregnancy with 15 completed weeks gestation             Luis M Renee MD  Obstetrics  [facility] - Mother & Baby

## 2025-02-13 NOTE — LACTATION NOTE
02/13/25 0900   Maternal Assessment   Breast Density Bilateral:;filling   Areola Bilateral:;elastic   Nipples Bilateral:;everted   Maternal Infant Feeding   Maternal Emotional State independent;relaxed   Infant Positioning cradle   Signs of Milk Transfer audible swallow;infant jaw motion present;breasts soften with feeding   Pain with Feeding no     Mother with baby cueing to feed -baby latches easily to left side -strong sucking with swallows noted and mother denies discomfort with feeding -breasts filling today -reinforced baby at breast on cue and avoid formula supplementation -preparing for discharge today -review breastfeeding discharge information with mother -aware of need to monitor wet and dirty diapers over the next few days -referred to discharge After Visit summary for community resources-has Rx for breast pump to be delivered to pt  -all questions answered and states understanding of information -will follow-up with pediatrician tomorrow

## 2025-02-13 NOTE — LACTATION NOTE
02/12/25 1740   Maternal Assessment   Breast Density Bilateral:;soft   Areola Bilateral:;elastic   Nipples Bilateral:;everted   Maternal Infant Feeding   Maternal Emotional State assist needed   Infant Positioning cradle   Signs of Milk Transfer audible swallow;infant jaw motion present   Pain with Feeding no   Comfort Measures Before/During Feeding infant position adjusted;latch adjusted;maternal position adjusted   Latch Assistance yes     Assisted to latch baby to left breast in cradle position. Baby latched deeply, nursing well with audible swallows. Mother denies pain during feeding.     Instructed on proper latch to facilitate effective breastfeeding.  Discussed recognizing hunger cues, appropriate positioning and wide mouth latch.  Discussed ways to determine an effective latch including:  areola included in latch, rhythmic/nutritive sucking and audible swallowing.  Also discussed soreness/tenderness associated with latch and prevention and treatment.  Pt states understanding and verbalized appropriate recall.

## 2025-02-13 NOTE — PROGRESS NOTES
[facility]  Obstetrics  Postpartum Progress Note    Patient Name: Ying Nicole  MRN: [id number]  Admission Date: 2/11/2025  Hospital Length of Stay: 2 days  Attending Physician: Luis M Renee MD  Primary Care Provider: Simi, Primary Doctor    Subjective:     Principal Problem:Pain during labor    Hospital Course:  Pt noted to be 5 cm upon presentation with regular ctx's.  AROM done, mild meconium noted.  Pt received epidural.  Pt received ampicillin GBS prophylaxis.  Pt had uncomplicated VAVD with repair of 2nd deg laceration.    PP course uncomplicated.    Interval History: PPD #2 s/p VAVD    She is doing well this morning. She is tolerating a regular diet without nausea or vomiting. She is voiding spontaneously. She is ambulating. She has passed flatus, and has not a BM. Vaginal bleeding is mild. She denies fever or chills. Abdominal pain is mild and controlled with oral medications. She Is breastfeeding. She desires circumcision for her male baby: no.    Objective:     Vital Signs (Most Recent):  Temp: 98.6 °F (37 °C) (02/13/25 1123)  Pulse: 60 (02/13/25 1123)  Resp: 18 (02/13/25 1123)  BP: (!) 119/54 (02/13/25 1123)  SpO2: 96 % (02/13/25 1123) Vital Signs (24h Range):  Temp:  [98.1 °F (36.7 °C)-98.6 °F (37 °C)] 98.6 °F (37 °C)  Pulse:  [58-66] 60  Resp:  [17-18] 18  SpO2:  [96 %-100 %] 96 %  BP: ()/(50-55) 119/54     Weight: 69.9 kg (154 lb)  Body mass index is 30.08 kg/m².      Intake/Output Summary (Last 24 hours) at 2/13/2025 1235  Last data filed at 2/12/2025 2300  Gross per 24 hour   Intake 480 ml   Output --   Net 480 ml         Significant Labs:  Lab Results   Component Value Date    GROUPTRH A POS 02/11/2025    HEPBSAG Non-reactive 02/11/2025     Recent Labs   Lab 02/12/25  0525   HGB 11.5*   HCT 34.3*       I have personallly reviewed all pertinent lab results from the last 24 hours.    Physical Exam:   Constitutional: She is oriented to person, place, and time. She  appears well-developed and well-nourished. No distress.    HENT:   Head: Normocephalic and atraumatic.     Neck: No thyromegaly present.     Pulmonary/Chest: Effort normal. No respiratory distress.        Abdominal: Soft. She exhibits no distension and no mass. There is no abdominal tenderness. There is no rebound and no guarding.             Musculoskeletal: Normal range of motion and moves all extremeties.       Neurological: She is alert and oriented to person, place, and time. No cranial nerve deficit. Coordination normal.    Skin: Skin is warm. She is not diaphoretic. No erythema.    Psychiatric: She has a normal mood and affect. Her behavior is normal. Judgment and thought content normal.         Assessment/Plan:     24 y.o. female  for:    Vacuum-assisted vaginal delivery  Routine pp care; dismiss today          Disposition: As patient meets milestones, will plan to discharge today.    Luis M Renee MD  Obstetrics  [facility] [place] [facility]

## 2025-02-13 NOTE — SUBJECTIVE & OBJECTIVE
Interval History: PPD #2 s/p VAVD    She is doing well this morning. She is tolerating a regular diet without nausea or vomiting. She is voiding spontaneously. She is ambulating. She has passed flatus, and has not a BM. Vaginal bleeding is mild. She denies fever or chills. Abdominal pain is mild and controlled with oral medications. She Is breastfeeding. She desires circumcision for her male baby: no.    Objective:     Vital Signs (Most Recent):  Temp: 98.6 °F (37 °C) (02/13/25 1123)  Pulse: 60 (02/13/25 1123)  Resp: 18 (02/13/25 1123)  BP: (!) 119/54 (02/13/25 1123)  SpO2: 96 % (02/13/25 1123) Vital Signs (24h Range):  Temp:  [98.1 °F (36.7 °C)-98.6 °F (37 °C)] 98.6 °F (37 °C)  Pulse:  [58-66] 60  Resp:  [17-18] 18  SpO2:  [96 %-100 %] 96 %  BP: ()/(50-55) 119/54     Weight: 69.9 kg (154 lb)  Body mass index is 30.08 kg/m².      Intake/Output Summary (Last 24 hours) at 2/13/2025 1235  Last data filed at 2/12/2025 2300  Gross per 24 hour   Intake 480 ml   Output --   Net 480 ml         Significant Labs:  Lab Results   Component Value Date    GROUPTRH A POS 02/11/2025    HEPBSAG Non-reactive 02/11/2025     Recent Labs   Lab 02/12/25  0525   HGB 11.5*   HCT 34.3*       I have personallly reviewed all pertinent lab results from the last 24 hours.    Physical Exam:   Constitutional: She is oriented to person, place, and time. She appears well-developed and well-nourished. No distress.    HENT:   Head: Normocephalic and atraumatic.     Neck: No thyromegaly present.     Pulmonary/Chest: Effort normal. No respiratory distress.        Abdominal: Soft. She exhibits no distension and no mass. There is no abdominal tenderness. There is no rebound and no guarding.             Musculoskeletal: Normal range of motion and moves all extremeties.       Neurological: She is alert and oriented to person, place, and time. No cranial nerve deficit. Coordination normal.    Skin: Skin is warm. She is not diaphoretic. No erythema.     Psychiatric: She has a normal mood and affect. Her behavior is normal. Judgment and thought content normal.

## 2025-02-13 NOTE — ANESTHESIA POSTPROCEDURE EVALUATION
Anesthesia Post Evaluation    Patient: Ying Nicole    Procedure(s) Performed: * No procedures listed *    Final Anesthesia Type: epidural      Patient location during evaluation: labor & delivery  Patient participation: Yes- Able to Participate  Level of consciousness: awake and alert and oriented  Post-procedure vital signs: reviewed and stable  Pain management: adequate  Airway patency: patent    PONV status at discharge: No PONV  Anesthetic complications: no      Cardiovascular status: blood pressure returned to baseline and hemodynamically stable  Respiratory status: unassisted, spontaneous ventilation and room air  Hydration status: euvolemic  Follow-up not needed.              Vitals Value Taken Time   /51 02/13/25 0734   Temp 36.7 °C (98.1 °F) 02/13/25 0734   Pulse 58 02/13/25 0734   Resp 18 02/13/25 0734   SpO2 100 % 02/13/25 0734         No case tracking events are documented in the log.      Pain/Yomaira Score: Pain Rating Prior to Med Admin: 2 (2/13/2025  6:00 AM)  Pain Rating Post Med Admin: 0 (2/12/2025 10:56 PM)

## 2025-02-13 NOTE — PLAN OF CARE
Problem: Adult Inpatient Plan of Care  Goal: Plan of Care Review  Outcome: Progressing  Goal: Patient-Specific Goal (Individualized)  Outcome: Progressing  Goal: Absence of Hospital-Acquired Illness or Injury  Outcome: Progressing  Goal: Optimal Comfort and Wellbeing  Outcome: Progressing  Goal: Readiness for Transition of Care  Outcome: Progressing     Problem:  Fall Injury Risk  Goal: Absence of Fall, Infant Drop and Related Injury  Outcome: Progressing     Problem: Infection  Goal: Absence of Infection Signs and Symptoms  Outcome: Progressing     Problem: Breastfeeding  Goal: Effective Breastfeeding  Outcome: Progressing     Problem: Postpartum (Vaginal Delivery)  Goal: Successful Parent Role Transition  Outcome: Progressing  Goal: Hemostasis  Outcome: Progressing  Goal: Absence of Infection Signs and Symptoms  Outcome: Progressing  Goal: Optimal Pain Control and Function  Outcome: Progressing

## 2025-02-14 LAB — GROUP B STREPTOCOCCUS, PCR: NEGATIVE

## 2025-02-16 ENCOUNTER — TELEPHONE (OUTPATIENT)
Dept: LACTATION | Facility: CLINIC | Age: 25
End: 2025-02-16
Payer: MEDICAID

## 2025-02-17 ENCOUNTER — TELEPHONE (OUTPATIENT)
Dept: OBSTETRICS AND GYNECOLOGY | Facility: HOSPITAL | Age: 25
End: 2025-02-17
Payer: MEDICAID

## 2025-02-17 NOTE — TELEPHONE ENCOUNTER
Spoke to pt who states breastfeeding going well and baby doing well at home -baby having frequent wet and dirty diapers at least 4-5 stools a day and many wet diapers -baby sleeps well between feedings and mother denies any problems with breasts -has no concerns for us

## 2025-03-03 ENCOUNTER — PATIENT MESSAGE (OUTPATIENT)
Dept: OBSTETRICS AND GYNECOLOGY | Facility: HOSPITAL | Age: 25
End: 2025-03-03
Payer: MEDICAID

## 2025-03-17 ENCOUNTER — TELEPHONE (OUTPATIENT)
Dept: OBSTETRICS AND GYNECOLOGY | Facility: CLINIC | Age: 25
End: 2025-03-17
Payer: MEDICAID

## 2025-03-17 NOTE — TELEPHONE ENCOUNTER
PP appt r/s'd     ----- Message from Fatimah sent at 3/17/2025  9:25 AM CDT -----  Type: Patient Call BackWhangie called:pt What is the request in detail:pt requesting to make an appt for post partum for 3/19/25 in the morning. Had appt this morning but couldn't make it. Call pt Can the clinic reply by [facility]?Would the patient rather a call back or a response via [facility][phone] (home) [phone] (work)Additional Information: